# Patient Record
Sex: MALE | Race: WHITE | Employment: FULL TIME | URBAN - METROPOLITAN AREA
[De-identification: names, ages, dates, MRNs, and addresses within clinical notes are randomized per-mention and may not be internally consistent; named-entity substitution may affect disease eponyms.]

---

## 2017-06-15 ENCOUNTER — OFFICE VISIT (OUTPATIENT)
Dept: INTERNAL MEDICINE CLINIC | Age: 42
End: 2017-06-15

## 2017-06-15 VITALS
WEIGHT: 152.2 LBS | TEMPERATURE: 95.9 F | OXYGEN SATURATION: 97 % | SYSTOLIC BLOOD PRESSURE: 121 MMHG | BODY MASS INDEX: 24.46 KG/M2 | HEIGHT: 66 IN | DIASTOLIC BLOOD PRESSURE: 80 MMHG | RESPIRATION RATE: 18 BRPM | HEART RATE: 62 BPM

## 2017-06-15 DIAGNOSIS — M79.2 NEUROPATHIC PAIN OF FOOT, RIGHT: Primary | ICD-10-CM

## 2017-06-15 RX ORDER — PANTOPRAZOLE SODIUM 40 MG/1
TABLET, DELAYED RELEASE ORAL
Refills: 5 | COMMUNITY
Start: 2017-05-22 | End: 2018-12-14

## 2017-06-15 RX ORDER — OXCARBAZEPINE 300 MG/1
300 TABLET, FILM COATED ORAL DAILY
Qty: 30 TAB | Refills: 2 | Status: SHIPPED | OUTPATIENT
Start: 2017-06-15 | End: 2017-12-30 | Stop reason: SDUPTHER

## 2017-06-15 NOTE — PROGRESS NOTES
Subjective:     Chief Complaint   Patient presents with    Medication Refill        He  is a 42y.o. year old male who presents today for medication refill for Trileptal. He has been taking this med for many years as needed for right foot pain. Reports that he has been experiencing the pain since he has the surgery in his thoracic spine when he was in his in 10 yrs. He was seen by [facility] and after trial of different med Trileptal worked better. See last note for more info. Pertinent items are noted in HPI. Objective:     Vitals:    06/15/17 1213   BP: 121/80   Pulse: 62   Resp: 18   Temp: 95.9 °F (35.5 °C)   TempSrc: Oral   SpO2: 97%   Weight: 152 lb 3.2 oz (69 kg)   Height: 5' 6\" (1.676 m)       Physical Examination: General appearance - alert, well appearing, and in no distress, oriented to person, place, and time and normal appearing weight  Mental status - alert, oriented to person, place, and time, normal mood, behavior, speech, dress, motor activity, and thought processes  Chest - clear to auscultation, no wheezes, rales or rhonchi, symmetric air entry  Heart - normal rate, regular rhythm, normal S1, S2, no murmurs, rubs, clicks or gallops  Neurological - limping noted.  Otherwise normal.      Allergies   Allergen Reactions    Clindamycin Rash      Social History     Social History    Marital status: SINGLE     Spouse name: N/A    Number of children: N/A    Years of education: N/A     Social History Main Topics    Smoking status: Never Smoker    Smokeless tobacco: Never Used    Alcohol use Yes      Comment: socially    Drug use: No    Sexual activity: Not Currently     Other Topics Concern    None     Social History Narrative      Family History   Problem Relation Age of Onset    Stroke Father     Hypertension Father     Cancer Maternal Grandfather     Stroke Paternal Grandmother       Past Surgical History:   Procedure Laterality Date    ABDOMEN SURGERY PROC UNLISTED Inguinal hernia repair    HX ORTHOPAEDIC      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 H/O radiation treatment . 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 30 Tab 2    pantoprazole (PROTONIX) 40 mg tablet TAKE 1 TABLET BY MOUTH EVERY MORNING  5        Assessment/ Plan:   Keny Hagan was seen today for medication refill. Diagnoses and all orders for this visit:    Neuropathic pain of foot, right  -     OXcarbazepine (TRILEPTAL) 300 mg tablet; Take 1 Tab by mouth daily.  -     CBC W/O DIFF  -     HEPATIC FUNCTION PANEL           Medication risks/benefits/costs/interactions/alternatives discussed with patient. Advised patient to call back or return to office if symptoms worsen/change/persist. If patient cannot reach us or should anything more severe/urgent arise he/she should proceed directly to the nearest emergency department. Discussed expected course/resolution/complications of diagnosis in detail with patient. Patient given a written after visit summary which includes her diagnoses, current medications and vitals. Patient expressed understanding with the diagnosis and plan. Follow-up Disposition:  Return in about 1 month (around 7/15/2017) for complete physical  and fasting blood work. Sidney Barthel

## 2017-06-15 NOTE — LETTER
6/16/2017 7:53 AM 
 
Mr. Baron Genaro [facility] [address] Dear Baron Lam: Please find your most recent results below. Resulted Orders CBC W/O DIFF Result Value Ref Range WBC 6.7 3.4 - 10.8 x10E3/uL  
 RBC 4.94 4.14 - 5.80 x10E6/uL HGB 15.3 12.6 - 17.7 g/dL HCT 45.2 37.5 - 51.0 % MCV 92 79 - 97 fL  
 MCH 31.0 26.6 - 33.0 pg  
 MCHC 33.8 31.5 - 35.7 g/dL  
 RDW 13.4 12.3 - 15.4 % PLATELET 804 710 - 791 x10E3/uL Narrative Performed at:  [address]  [id number] : Helen Nunez MD, Phone:  [phone] HEPATIC FUNCTION PANEL Result Value Ref Range Protein, total 7.1 6.0 - 8.5 g/dL Albumin 4.6 3.5 - 5.5 g/dL Bilirubin, total 0.7 0.0 - 1.2 mg/dL Bilirubin, direct 0.13 0.00 - 0.40 mg/dL Alk. phosphatase 114 39 - 117 IU/L  
 AST (SGOT) 17 0 - 40 IU/L  
 ALT (SGPT) 13 0 - 44 IU/L Narrative Performed at:  [address]  [id number] : Helen Nunez MD, Phone:  [phone] RECOMMENDATIONS: 
 
CBC and liver function is normal. 
 
Please call me if you have any questions: [phone] Sincerely, 
 
 
León Sarabia MD

## 2017-06-15 NOTE — MR AVS SNAPSHOT
Visit Information Date & Time Provider Department Dept. Phone Encounter #  
 6/15/2017 12:15 PM León Sarabia MD [facility] [phone] [id number] Follow-up Instructions Return in about 1 month (around 7/15/2017) for complete physical  and fasting blood work. Pooja Soriano Upcoming Health Maintenance Date Due DTaP/Tdap/Td series (1 - Tdap) 11/17/1995 INFLUENZA AGE 9 TO ADULT 8/1/2017 Allergies as of 6/15/2017  Review Complete On: 6/15/2017 By: Charles Allred LPN Severity Noted Reaction Type Reactions Clindamycin Medium 08/11/2013   Systemic Rash Current Immunizations  Never Reviewed No immunizations on file. Not reviewed this visit You Were Diagnosed With   
  
 Codes Comments Neuropathic pain of foot, right    -  Primary ICD-10-CM: G57.91 
ICD-9-CM: 355.8 Vitals BP Pulse Temp Resp Height(growth percentile) Weight(growth percentile) 121/80 (BP 1 Location: Left arm, BP Patient Position: Sitting) 62 95.9 °F (35.5 °C) (Oral) 18 5' 6\" (1.676 m) 152 lb 3.2 oz (69 kg) SpO2 BMI Smoking Status 97% 24.57 kg/m2 Never Smoker Vitals History BMI and BSA Data Body Mass Index Body Surface Area 24.57 kg/m 2 1.79 m 2 Preferred Pharmacy Pharmacy Name Phone [id number] [place], [place] - [address] AT [address] [place] [address] [phone] Your Updated Medication List  
  
   
This list is accurate as of: 6/15/17 12:28 PM.  Always use your most recent med list. OXcarbazepine 300 mg tablet Commonly known as:  TRILEPTAL Take 1 Tab by mouth daily. pantoprazole 40 mg tablet Commonly known as:  PROTONIX  
TAKE 1 TABLET BY MOUTH EVERY MORNING Prescriptions Sent to Pharmacy Refills OXcarbazepine (TRILEPTAL) 300 mg tablet 2 Sig: Take 1 Tab by mouth daily.   
 Class: Normal  
 Pharmacy: [facility]  #: [phone] Route: Oral  
  
We Performed the Following CBC W/O DIFF [99483 CPT(R)] HEPATIC FUNCTION PANEL [76407 CPT(R)] Follow-up Instructions Return in about 1 month (around 7/15/2017) for complete physical  and fasting blood work. Teddy Guzman [facility] SERVICES! Dear Jong Johnson: 
Thank you for requesting a [facility] account. Our records indicate that you have previously registered for a [employer] [facility] [employer] account but its currently inactive. Please call our [employer] [facility] support line at [phone]. Additional Information If you have questions, please visit the Frequently Asked Questions section of the [employer] website at [email or web address] Remember, [employer] [facility] is NOT to be used for urgent needs. For medical emergencies, dial 911. Now available from your iPhone and Android! Please provide this summary of care documentation to your next provider. Your primary care clinician is listed as Cholo Reyes. If you have any questions after today's visit, please call [phone].

## 2017-06-16 LAB
ALBUMIN SERPL-MCNC: 4.6 G/DL (ref 3.5–5.5)
ALP SERPL-CCNC: 114 IU/L (ref 39–117)
ALT SERPL-CCNC: 13 IU/L (ref 0–44)
AST SERPL-CCNC: 17 IU/L (ref 0–40)
BILIRUB DIRECT SERPL-MCNC: 0.13 MG/DL (ref 0–0.4)
BILIRUB SERPL-MCNC: 0.7 MG/DL (ref 0–1.2)
ERYTHROCYTE [DISTWIDTH] IN BLOOD BY AUTOMATED COUNT: 13.4 % (ref 12.3–15.4)
HCT VFR BLD AUTO: 45.2 % (ref 37.5–51)
HGB BLD-MCNC: 15.3 G/DL (ref 12.6–17.7)
MCH RBC QN AUTO: 31 PG (ref 26.6–33)
MCHC RBC AUTO-ENTMCNC: 33.8 G/DL (ref 31.5–35.7)
MCV RBC AUTO: 92 FL (ref 79–97)
PLATELET # BLD AUTO: 285 X10E3/UL (ref 150–379)
PROT SERPL-MCNC: 7.1 G/DL (ref 6–8.5)
RBC # BLD AUTO: 4.94 X10E6/UL (ref 4.14–5.8)
WBC # BLD AUTO: 6.7 X10E3/UL (ref 3.4–10.8)

## 2017-12-30 DIAGNOSIS — M79.2 NEUROPATHIC PAIN OF FOOT, RIGHT: ICD-10-CM

## 2018-01-02 RX ORDER — OXCARBAZEPINE 300 MG/1
TABLET, FILM COATED ORAL
Qty: 30 TAB | Refills: 2 | Status: SHIPPED | OUTPATIENT
Start: 2018-01-02 | End: 2021-05-04 | Stop reason: SDUPTHER

## 2018-11-03 ENCOUNTER — APPOINTMENT (OUTPATIENT)
Dept: CT IMAGING | Age: 43
End: 2018-11-03
Attending: PHYSICIAN ASSISTANT
Payer: COMMERCIAL

## 2018-11-03 ENCOUNTER — HOSPITAL ENCOUNTER (EMERGENCY)
Age: 43
Discharge: HOME | End: 2018-11-03
Attending: EMERGENCY MEDICINE
Payer: COMMERCIAL

## 2018-11-03 VITALS
OXYGEN SATURATION: 98 % | TEMPERATURE: 98.1 F | RESPIRATION RATE: 15 BRPM | HEART RATE: 91 BPM | DIASTOLIC BLOOD PRESSURE: 94 MMHG | SYSTOLIC BLOOD PRESSURE: 158 MMHG
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
## 2018-11-03 DIAGNOSIS — R10.32 ABDOMINAL PAIN, LLQ (LEFT LOWER QUADRANT): Primary | ICD-10-CM

## 2018-11-03 LAB
ALBUMIN SERPL-MCNC: 3.9 G/DL (ref 3.5–5)
ALBUMIN/GLOB SERPL: 1.1 {RATIO} (ref 1.1–2.2)
ALP SERPL-CCNC: 130 U/L (ref 45–117)
ALT SERPL-CCNC: 25 U/L (ref 12–78)
ANION GAP SERPL CALC-SCNC: 9 MMOL/L (ref 5–15)
AST SERPL-CCNC: 15 U/L (ref 15–37)
BASOPHILS # BLD: 0.1 K/UL (ref 0–0.1)
BASOPHILS NFR BLD: 1 % (ref 0–1)
BILIRUB SERPL-MCNC: 0.5 MG/DL (ref 0.2–1)
BUN SERPL-MCNC: 19 MG/DL (ref 6–20)
BUN/CREAT SERPL: 15 (ref 12–20)
CALCIUM SERPL-MCNC: 8.6 MG/DL (ref 8.5–10.1)
CHLORIDE SERPL-SCNC: 106 MMOL/L (ref 97–108)
CO2 SERPL-SCNC: 25 MMOL/L (ref 21–32)
COMMENT, HOLDF: NORMAL
CREAT SERPL-MCNC: 1.29 MG/DL (ref 0.7–1.3)
DIFFERENTIAL METHOD BLD: ABNORMAL
EOSINOPHIL # BLD: 0.1 K/UL (ref 0–0.4)
EOSINOPHIL NFR BLD: 2 % (ref 0–7)
ERYTHROCYTE [DISTWIDTH] IN BLOOD BY AUTOMATED COUNT: 12.2 % (ref 11.5–14.5)
GLOBULIN SER CALC-MCNC: 3.7 G/DL (ref 2–4)
GLUCOSE SERPL-MCNC: 91 MG/DL (ref 65–100)
HCT VFR BLD AUTO: 45 % (ref 36.6–50.3)
HGB BLD-MCNC: 15.6 G/DL (ref 12.1–17)
IMM GRANULOCYTES # BLD: 0 K/UL (ref 0–0.04)
IMM GRANULOCYTES NFR BLD AUTO: 1 % (ref 0–0.5)
LIPASE SERPL-CCNC: 134 U/L (ref 73–393)
LYMPHOCYTES # BLD: 1.4 K/UL (ref 0.8–3.5)
LYMPHOCYTES NFR BLD: 26 % (ref 12–49)
MCH RBC QN AUTO: 31.8 PG (ref 26–34)
MCHC RBC AUTO-ENTMCNC: 34.7 G/DL (ref 30–36.5)
MCV RBC AUTO: 91.8 FL (ref 80–99)
MONOCYTES # BLD: 0.7 K/UL (ref 0–1)
MONOCYTES NFR BLD: 13 % (ref 5–13)
NEUTS SEG # BLD: 3.1 K/UL (ref 1.8–8)
NEUTS SEG NFR BLD: 57 % (ref 32–75)
NRBC # BLD: 0 K/UL (ref 0–0.01)
NRBC BLD-RTO: 0 PER 100 WBC
PLATELET # BLD AUTO: 263 K/UL (ref 150–400)
PMV BLD AUTO: 9.8 FL (ref 8.9–12.9)
POTASSIUM SERPL-SCNC: 4.1 MMOL/L (ref 3.5–5.1)
PROT SERPL-MCNC: 7.6 G/DL (ref 6.4–8.2)
RBC # BLD AUTO: 4.9 M/UL (ref 4.1–5.7)
SAMPLES BEING HELD,HOLD: NORMAL
SODIUM SERPL-SCNC: 140 MMOL/L (ref 136–145)
WBC # BLD AUTO: 5.4 K/UL (ref 4.1–11.1)

## 2018-11-03 PROCEDURE — 74177 CT ABD & PELVIS W/CONTRAST: CPT

## 2018-11-03 PROCEDURE — 99283 EMERGENCY DEPT VISIT LOW MDM: CPT

## 2018-11-03 PROCEDURE — 36415 COLL VENOUS BLD VENIPUNCTURE: CPT | Performed by: PHYSICIAN ASSISTANT

## 2018-11-03 PROCEDURE — 83690 ASSAY OF LIPASE: CPT | Performed by: PHYSICIAN ASSISTANT

## 2018-11-03 PROCEDURE — 85025 COMPLETE CBC W/AUTO DIFF WBC: CPT | Performed by: PHYSICIAN ASSISTANT

## 2018-11-03 PROCEDURE — 74011636320 HC RX REV CODE- 636/320: Performed by: EMERGENCY MEDICINE

## 2018-11-03 PROCEDURE — 74011000258 HC RX REV CODE- 258: Performed by: EMERGENCY MEDICINE

## 2018-11-03 PROCEDURE — 80053 COMPREHEN METABOLIC PANEL: CPT | Performed by: PHYSICIAN ASSISTANT

## 2018-11-03 RX ORDER — SODIUM CHLORIDE 9 MG/ML
50 INJECTION, SOLUTION INTRAVENOUS
Status: COMPLETED | OUTPATIENT
Start: 2018-11-03 | End: 2018-11-03

## 2018-11-03 RX ADMIN — SODIUM CHLORIDE 50 ML/HR: 900 INJECTION, SOLUTION INTRAVENOUS at 15:10

## 2018-11-03 RX ADMIN — IOPAMIDOL 100 ML: 755 INJECTION, SOLUTION INTRAVENOUS at 15:10

## 2018-11-03 RX ADMIN — SODIUM CHLORIDE 100 ML: 900 INJECTION, SOLUTION INTRAVENOUS at 14:19
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
## 2018-12-14 ENCOUNTER — HOSPITAL ENCOUNTER (EMERGENCY)
Age: 43
Discharge: HOME | End: 2018-12-14
Attending: EMERGENCY MEDICINE | Admitting: EMERGENCY MEDICINE
Payer: COMMERCIAL

## 2018-12-14 ENCOUNTER — APPOINTMENT (OUTPATIENT)
Dept: GENERAL RADIOLOGY | Age: 43
End: 2018-12-14
Attending: EMERGENCY MEDICINE
Payer: COMMERCIAL

## 2018-12-14 VITALS
DIASTOLIC BLOOD PRESSURE: 84 MMHG | HEART RATE: 87 BPM | TEMPERATURE: 98 F | OXYGEN SATURATION: 97 % | SYSTOLIC BLOOD PRESSURE: 125 MMHG | RESPIRATION RATE: 30 BRPM

## 2018-12-14 DIAGNOSIS — R10.13 ABDOMINAL PAIN, EPIGASTRIC: Primary | ICD-10-CM

## 2018-12-14 DIAGNOSIS — R05.9 COUGH: ICD-10-CM

## 2018-12-14 DIAGNOSIS — R07.9 CHEST PAIN, UNSPECIFIED TYPE: ICD-10-CM

## 2018-12-14 DIAGNOSIS — M79.2 NEUROPATHIC PAIN OF FOOT, RIGHT: ICD-10-CM

## 2018-12-14 LAB
ALBUMIN SERPL-MCNC: 3.5 G/DL (ref 3.5–5)
ALBUMIN/GLOB SERPL: 1 {RATIO} (ref 1.1–2.2)
ALP SERPL-CCNC: 103 U/L (ref 45–117)
ALT SERPL-CCNC: 32 U/L (ref 12–78)
ANION GAP SERPL CALC-SCNC: 8 MMOL/L (ref 5–15)
AST SERPL-CCNC: 16 U/L (ref 15–37)
ATRIAL RATE: 81 BPM
BASOPHILS # BLD: 0 K/UL (ref 0–0.1)
BASOPHILS NFR BLD: 1 % (ref 0–1)
BILIRUB SERPL-MCNC: 0.3 MG/DL (ref 0.2–1)
BUN SERPL-MCNC: 22 MG/DL (ref 6–20)
BUN/CREAT SERPL: 17 (ref 12–20)
CALCIUM SERPL-MCNC: 8 MG/DL (ref 8.5–10.1)
CALCULATED P AXIS, ECG09: 48 DEGREES
CALCULATED R AXIS, ECG10: 61 DEGREES
CALCULATED T AXIS, ECG11: 58 DEGREES
CHLORIDE SERPL-SCNC: 103 MMOL/L (ref 97–108)
CO2 SERPL-SCNC: 27 MMOL/L (ref 21–32)
COMMENT, HOLDF: NORMAL
CREAT SERPL-MCNC: 1.26 MG/DL (ref 0.7–1.3)
D DIMER PPP FEU-MCNC: <0.19 MG/L FEU (ref 0–0.65)
DIAGNOSIS, 93000: NORMAL
DIFFERENTIAL METHOD BLD: ABNORMAL
EOSINOPHIL # BLD: 0.1 K/UL (ref 0–0.4)
EOSINOPHIL NFR BLD: 1 % (ref 0–7)
ERYTHROCYTE [DISTWIDTH] IN BLOOD BY AUTOMATED COUNT: 12 % (ref 11.5–14.5)
GLOBULIN SER CALC-MCNC: 3.6 G/DL (ref 2–4)
GLUCOSE SERPL-MCNC: 100 MG/DL (ref 65–100)
HCT VFR BLD AUTO: 41.4 % (ref 36.6–50.3)
HGB BLD-MCNC: 14.3 G/DL (ref 12.1–17)
IMM GRANULOCYTES # BLD: 0.1 K/UL (ref 0–0.04)
IMM GRANULOCYTES NFR BLD AUTO: 1 % (ref 0–0.5)
LIPASE SERPL-CCNC: 105 U/L (ref 73–393)
LYMPHOCYTES # BLD: 1.5 K/UL (ref 0.8–3.5)
LYMPHOCYTES NFR BLD: 25 % (ref 12–49)
MCH RBC QN AUTO: 31.7 PG (ref 26–34)
MCHC RBC AUTO-ENTMCNC: 34.5 G/DL (ref 30–36.5)
MCV RBC AUTO: 91.8 FL (ref 80–99)
MONOCYTES # BLD: 0.6 K/UL (ref 0–1)
MONOCYTES NFR BLD: 10 % (ref 5–13)
NEUTS SEG # BLD: 3.6 K/UL (ref 1.8–8)
NEUTS SEG NFR BLD: 62 % (ref 32–75)
NRBC # BLD: 0 K/UL (ref 0–0.01)
NRBC BLD-RTO: 0 PER 100 WBC
P-R INTERVAL, ECG05: 158 MS
PLATELET # BLD AUTO: 236 K/UL (ref 150–400)
PMV BLD AUTO: 9.7 FL (ref 8.9–12.9)
POTASSIUM SERPL-SCNC: 3.6 MMOL/L (ref 3.5–5.1)
PROT SERPL-MCNC: 7.1 G/DL (ref 6.4–8.2)
Q-T INTERVAL, ECG07: 362 MS
QRS DURATION, ECG06: 76 MS
QTC CALCULATION (BEZET), ECG08: 420 MS
RBC # BLD AUTO: 4.51 M/UL (ref 4.1–5.7)
SAMPLES BEING HELD,HOLD: NORMAL
SODIUM SERPL-SCNC: 138 MMOL/L (ref 136–145)
TROPONIN I BLD-MCNC: <0.04 NG/ML (ref 0–0.08)
TROPONIN I SERPL-MCNC: <0.05 NG/ML
VENTRICULAR RATE, ECG03: 81 BPM
WBC # BLD AUTO: 5.8 K/UL (ref 4.1–11.1)

## 2018-12-14 PROCEDURE — 84484 ASSAY OF TROPONIN QUANT: CPT

## 2018-12-14 PROCEDURE — 36415 COLL VENOUS BLD VENIPUNCTURE: CPT

## 2018-12-14 PROCEDURE — 85025 COMPLETE CBC W/AUTO DIFF WBC: CPT

## 2018-12-14 PROCEDURE — 83690 ASSAY OF LIPASE: CPT

## 2018-12-14 PROCEDURE — 74011250636 HC RX REV CODE- 250/636: Performed by: EMERGENCY MEDICINE

## 2018-12-14 PROCEDURE — 74011000250 HC RX REV CODE- 250: Performed by: EMERGENCY MEDICINE

## 2018-12-14 PROCEDURE — 99285 EMERGENCY DEPT VISIT HI MDM: CPT

## 2018-12-14 PROCEDURE — 80053 COMPREHEN METABOLIC PANEL: CPT

## 2018-12-14 PROCEDURE — 71046 X-RAY EXAM CHEST 2 VIEWS: CPT

## 2018-12-14 PROCEDURE — 93005 ELECTROCARDIOGRAM TRACING: CPT

## 2018-12-14 PROCEDURE — 85379 FIBRIN DEGRADATION QUANT: CPT

## 2018-12-14 PROCEDURE — 96374 THER/PROPH/DIAG INJ IV PUSH: CPT

## 2018-12-14 PROCEDURE — 74011250637 HC RX REV CODE- 250/637: Performed by: EMERGENCY MEDICINE

## 2018-12-14 RX ORDER — MAG HYDROX/ALUMINUM HYD/SIMETH 200-200-20
SUSPENSION, ORAL (FINAL DOSE FORM) ORAL
Status: DISCONTINUED
Start: 2018-12-14 | End: 2018-12-14 | Stop reason: HOSPADM

## 2018-12-14 RX ORDER — LIDOCAINE HYDROCHLORIDE 20 MG/ML
SOLUTION OROPHARYNGEAL
Status: DISCONTINUED
Start: 2018-12-14 | End: 2018-12-14 | Stop reason: HOSPADM

## 2018-12-14 RX ORDER — FAMOTIDINE 10 MG/ML
INJECTION INTRAVENOUS
Status: DISCONTINUED
Start: 2018-12-14 | End: 2018-12-14 | Stop reason: HOSPADM

## 2018-12-14 RX ORDER — PANTOPRAZOLE SODIUM 40 MG/1
40 TABLET, DELAYED RELEASE ORAL DAILY
Qty: 30 TAB | Refills: 5 | Status: SHIPPED | OUTPATIENT
Start: 2018-12-14 | End: 2018-12-14

## 2018-12-14 RX ORDER — GUAIFENESIN 100 MG/5ML
LIQUID (ML) ORAL
Status: DISCONTINUED
Start: 2018-12-14 | End: 2018-12-14 | Stop reason: HOSPADM

## 2018-12-14 RX ORDER — PANTOPRAZOLE SODIUM 40 MG/1
40 TABLET, DELAYED RELEASE ORAL DAILY
Qty: 30 TAB | Refills: 5 | Status: SHIPPED | OUTPATIENT
Start: 2018-12-14 | End: 2019-01-13

## 2018-12-14 RX ORDER — GUAIFENESIN 100 MG/5ML
81 LIQUID (ML) ORAL
Status: COMPLETED | OUTPATIENT
Start: 2018-12-14 | End: 2018-12-14

## 2018-12-14 RX ORDER — FAMOTIDINE 10 MG/ML
20 INJECTION INTRAVENOUS
Status: DISCONTINUED | OUTPATIENT
Start: 2018-12-14 | End: 2018-12-14 | Stop reason: SDUPTHER

## 2018-12-14 RX ADMIN — ASPIRIN 81 MG 81 MG: 81 TABLET ORAL at 01:28

## 2018-12-14 RX ADMIN — LIDOCAINE HYDROCHLORIDE 40 ML: 20 SOLUTION ORAL; TOPICAL at 01:26

## 2018-12-14 RX ADMIN — FAMOTIDINE 20 MG: 10 INJECTION, SOLUTION INTRAVENOUS at 01:27

## 2018-12-14 NOTE — ED PROVIDER NOTES
The patient presents to the ED with chest pain, shortness of breath and cough. Symptoms began at 11 PM and woke him from his sleep. He has burning from his upper abdomen into his lower chest. The pain was constant, but has improved. The pain was moderate at a 5/10 and is now very mild. He has also developed an intermittent cough since the onset of pain. The pain is increased with coughing. He has mild shortness of breath. The pain is not increased with deep breath. He has hx reflux and esophageal strictures x 2. He had been on Protonix, but his GI MD took him off of it a few years ago. He cannot recall the name of his GI MD, but states \"it started with a P.\" He has mild nausea. No meds were taken PTA. He last at at 5:30 PM - had ribs and greens. No family hx of CAD or PE/DVT. No recent travel. 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 H/O radiation treatment .  1985    HX OTHER SURGICAL  8/11/13    I&D of buttocks abscess x3 by Dr Jameson Bedolla         Family History:   Problem Relation Age of Onset    Stroke Father     Hypertension Father     Cancer Maternal Grandfather     Stroke Paternal Grandmother        Social History     Socioeconomic History    Marital status: SINGLE     Spouse name: Not on file    Number of children: Not on file    Years of education: Not on file    Highest education level: Not on file   Social Needs    Financial resource strain: Not on file    Food insecurity - worry: Not on file    Food insecurity - inability: Not on file   [employer] needs - medical: Not on file   [employer] needs - non-medical: Not on file   Occupational History    Not on file   Tobacco Use    Smoking status: Never Smoker    Smokeless tobacco: Never Used   Substance and Sexual Activity    Alcohol use: Yes     Comment: socially    Drug use: No    Sexual activity: Not Currently   Other Topics Concern    Not on file   Social History Narrative    Not on file         ALLERGIES: Clindamycin    Review of Systems   Constitutional: Negative for appetite change and fever. HENT: Negative for congestion, nosebleeds and sore throat. Eyes: Negative for discharge and visual disturbance. Respiratory: Positive for cough and shortness of breath. Cardiovascular: Positive for chest pain. Gastrointestinal: Positive for abdominal pain. Negative for diarrhea, nausea and vomiting. Genitourinary: Negative for dysuria. Musculoskeletal: Negative. Skin: Negative for rash. Neurological: Negative for weakness and headaches. Hematological: Negative for adenopathy. Psychiatric/Behavioral: Negative. All other systems reviewed and are negative. Vitals:    12/14/18 0055 12/14/18 0112 12/14/18 0120 12/14/18 0200   BP:  131/86 131/86 128/82   Pulse: 92 81 82    Resp:  16 15 16   Temp:  98.2 °F (36.8 °C)     SpO2: 97% 97% 96% 99%            Physical Exam   Constitutional: He is oriented to person, place, and time. He appears well-developed and well-nourished. HENT:   Head: Normocephalic and atraumatic. Right Ear: External ear normal.   Mouth/Throat: Oropharynx is clear and moist.   Eyes: Conjunctivae and EOM are normal. Pupils are equal, round, and reactive to light. Neck: Normal range of motion. Neck supple. Cardiovascular: Normal rate, regular rhythm and normal heart sounds. Pulmonary/Chest: Effort normal and breath sounds normal.   Abdominal: Soft. Bowel sounds are normal. There is no tenderness. Musculoskeletal: Normal range of motion. He exhibits no edema or tenderness. Neurological: He is alert and oriented to person, place, and time. Skin: Skin is warm and dry.    Psychiatric: He has a normal mood and affect. His behavior is normal.   Nursing note and vitals reviewed. MDM       Procedures      ED EKG interpretation:  Rhythm: normal sinus rhythm; and regular . Rate (approx.): 80; Axis: normal; P wave: normal; QRS interval: normal ; ST/T wave: normal; This EKG was interpreted by Jp Acosta MD,ED Provider. A/P:  1. Chest pain - suspect related to GERD. 2. GERD - resume Protonix. F/U with GI.  3. Cough - suspect related to GERD      Patient's results have been reviewed with them. Patient and/or family have verbally conveyed their understanding and agreement of the patient's signs, symptoms, diagnosis, treatment and prognosis and additionally agree to follow up as recommended or return to the Emergency Room should their condition change prior to follow-up. Discharge instructions have also been provided to the patient with some educational information regarding their diagnosis as well a list of reasons why they would want to return to the ER prior to their follow-up appointment should their condition change.

## 2018-12-14 NOTE — DISCHARGE INSTRUCTIONS
- Please restart your Protonix. - Please call GI in the AM and arrange follow-up. - Return to ED for any concerns. Chest Pain: Care Instructions  Your Care Instructions  There are many things that can cause chest pain. Some are not serious and will get better on their own in a few days. But some kinds of chest pain need more testing and treatment. Your doctor may have recommended a follow-up visit in the next 8 to 12 hours. If you are not getting better, you may need more tests or treatment. Even though your doctor has released you, you still need to watch for any problems. The doctor carefully checked you, but sometimes problems can develop later. If you have new symptoms or if your symptoms do not get better, get medical care right away. If you have worse or different chest pain or pressure that lasts more than 5 minutes or you passed out (lost consciousness), call 911 or seek other emergency help right away. A medical visit is only one step in your treatment. Even if you feel better, you still need to do what your doctor recommends, such as going to all suggested follow-up appointments and taking medicines exactly as directed. This will help you recover and help prevent future problems. How can you care for yourself at home? · Rest until you feel better. · Take your medicine exactly as prescribed. Call your doctor if you think you are having a problem with your medicine. · Do not drive after taking a prescription pain medicine. When should you call for help? Call 911 if:    · You passed out (lost consciousness).     · You have severe difficulty breathing.     · You have symptoms of a heart attack. These may include:  ? Chest pain or pressure, or a strange feeling in your chest.  ? Sweating. ? Shortness of breath. ? Nausea or vomiting. ? Pain, pressure, or a strange feeling in your back, neck, jaw, or upper belly or in one or both shoulders or arms. ? Lightheadedness or sudden weakness. ?  A fast or irregular heartbeat. After you call 911, the  may tell you to chew 1 adult-strength or 2 to 4 low-dose aspirin. Wait for an ambulance. Do not try to drive yourself.    Call your doctor today if:    · You have any trouble breathing.     · Your chest pain gets worse.     · You are dizzy or lightheaded, or you feel like you may faint.     · You are not getting better as expected.     · You are having new or different chest pain. Where can you learn more? Go to http://alma-ashwin.info/. Enter A120 in the search box to learn more about \"Chest Pain: Care Instructions. \"  Current as of: November 20, 2017  Content Version: 11.8  © 6474-9550 [employer]. Care instructions adapted under license by [employer] (which disclaims liability or warranty for this information). If you have questions about a medical condition or this instruction, always ask your healthcare professional. [place] [address] any warranty or liability for your use of this information. Gastroesophageal Reflux Disease (GERD): Care Instructions  Your Care Instructions    Gastroesophageal reflux disease (GERD) is the backward flow of stomach acid into the esophagus. The esophagus is the tube that leads from your throat to your stomach. A one-way valve prevents the stomach acid from moving up into this tube. When you have GERD, this valve does not close tightly enough. If you have mild GERD symptoms including heartburn, you may be able to control the problem with antacids or over-the-counter medicine. Changing your diet, losing weight, and making other lifestyle changes can also help reduce symptoms. Follow-up care is a key part of your treatment and safety. Be sure to make and go to all appointments, and call your doctor if you are having problems. It's also a good idea to know your test results and keep a list of the medicines you take.   How can you care for yourself at home?  · Take your medicines exactly as prescribed. Call your doctor if you think you are having a problem with your medicine. · Your doctor may recommend over-the-counter medicine. For mild or occasional indigestion, antacids, such as Tums, Gaviscon, Mylanta, or Maalox, may help. Your doctor also may recommend over-the-counter acid reducers, such as Pepcid AC, Tagamet HB, Zantac 75, or Prilosec. Read and follow all instructions on the label. If you use these medicines often, talk with your doctor. · Change your eating habits. ? It's best to eat several small meals instead of two or three large meals. ? After you eat, wait 2 to 3 hours before you lie down. ? Chocolate, mint, and alcohol can make GERD worse. ? Spicy foods, foods that have a lot of acid (like tomatoes and oranges), and coffee can make GERD symptoms worse in some people. If your symptoms are worse after you eat a certain food, you may want to stop eating that food to see if your symptoms get better. · Do not smoke or chew tobacco. Smoking can make GERD worse. If you need help quitting, talk to your doctor about stop-smoking programs and medicines. These can increase your chances of quitting for good. · If you have GERD symptoms at night, raise the head of your bed 6 to 8 inches by putting the frame on blocks or placing a foam wedge under the head of your mattress. (Adding extra pillows does not work.)  · Do not wear tight clothing around your middle. · Lose weight if you need to. Losing just 5 to 10 pounds can help. When should you call for help? Call your doctor now or seek immediate medical care if:    · You have new or different belly pain.     · Your stools are black and tarlike or have streaks of blood.    Watch closely for changes in your health, and be sure to contact your doctor if:    · Your symptoms have not improved after 2 days.     · Food seems to catch in your throat or chest.   Where can you learn more?   Go to http://alma-ashwin.info/. Enter W582 in the search box to learn more about \"Gastroesophageal Reflux Disease (GERD): Care Instructions. \"  Current as of: March 28, 2018  Content Version: 11.8  © 2218-4458 [employer]. Care instructions adapted under license by [employer] (which disclaims liability or warranty for this information). If you have questions about a medical condition or this instruction, always ask your healthcare professional. [address] any warranty or liability for your use of this information. Esophageal Spasm: Care Instructions  Your Care Instructions    The esophagus is the tube that carries food from your mouth to your stomach. An esophageal spasm is when the muscles along the esophagus tighten in an irregular, painful way. Normally, the esophagus tightens in a coordinated manner to move food along and into the stomach. An esophageal spasm can prevent food from getting to the stomach. This leaves it stuck in the esophagus. The cause of esophageal spasm is not known, although it is more common in people who have gastroesophageal reflux disease (GERD). In some people, very hot or very cold foods can trigger a spasm. Follow-up care is a key part of your treatment and safety. Be sure to make and go to all appointments, and call your doctor if you are having problems. It's also a good idea to know your test results and keep a list of the medicines you take. How can you care for yourself at home? · Be safe with medicines. Take your medicines exactly as prescribed. Call your doctor if you think you are having a problem with your medicine. You will get more details on the specific medicines your doctor prescribes. · Treat other conditions that can make esophageal spasms worse, such as GERD. · To treat GERD:  ? Your doctor may recommend an over-the-counter medicine.  For mild or occasional indigestion, it may help to take antacids, such as Tums, Gaviscon, Mylanta, or Maalox. Be careful when you take over-the-counter antacid medicines. Many of these medicines have aspirin in them. Read the label to make sure that you are not taking more than the recommended dose. Too much aspirin can be harmful. ? Your doctor also may recommend over-the-counter acid reducers, such as famotidine (Pepcid AC), cimetidine (Tagamet HB), ranitidine (Zantac 75 and Zantac 150), or omeprazole (Prilosec). ? Eat several small meals instead of two or three large meals. ? After you eat, wait 2 to 3 hours before you lie down. ? Chocolate, mint, and alcohol can make GERD worse. They relax the valve between the esophagus and the stomach. ? Spicy foods, foods that have a lot of acid (like tomatoes and oranges), and coffee can make GERD symptoms worse in some people. If your symptoms are worse after you eat a certain food, you may want to stop eating that food to see if your symptoms get better. ? Do not smoke or chew tobacco.  ? If you have GERD symptoms at night, raise the head of your bed 6 to 8 inches. You can do this by putting the frame on blocks. Or you can place a foam wedge under the head of your mattress. (Adding extra pillows does not work.)  ? Do not wear tight clothing around your middle. ? Lose weight if you need to. Losing just 5 to 10 pounds can help. · Ask your doctor about relaxation and controlled breathing exercises. These may help reduce symptoms. · Avoid very hot or cold foods if they trigger esophageal spasms. When should you call for help? Call your doctor now or seek immediate medical care if:    · You have new or worse belly pain.     · You are vomiting.    Watch closely for changes in your health, and be sure to contact your doctor if:    · You have new or worse symptoms of indigestion.     · You have trouble or pain swallowing.     · You are losing weight.     · You do not get better as expected. Where can you learn more?   Go to http://alma-ashwin.info/. Enter T207 in the search box to learn more about \"Esophageal Spasm: Care Instructions. \"  Current as of: March 28, 2018  Content Version: 11.8  © 7795-5478 [employer]. Care instructions adapted under license by [employer] (which disclaims liability or warranty for this information). If you have questions about a medical condition or this instruction, always ask your healthcare professional. [place] [address] any warranty or liability for your use of this information.

## 2018-12-14 NOTE — ED TRIAGE NOTES
Pt arrives ambulatory from home c/o burning/pain in lower chest/epigastric area ~2300 last night when he went to sleep, pt reported he began coughing as well and then vomited x1; does report some SOB, denies cough prior to tonight; pt reports pain 5/10 at its worse, reports hx of reflux

## 2020-08-31 ENCOUNTER — OFFICE VISIT (OUTPATIENT)
Dept: PRIMARY CARE CLINIC | Age: 45
End: 2020-08-31
Payer: COMMERCIAL

## 2020-08-31 VITALS
BODY MASS INDEX: 26.03 KG/M2 | DIASTOLIC BLOOD PRESSURE: 81 MMHG | WEIGHT: 162 LBS | OXYGEN SATURATION: 100 % | HEIGHT: 66 IN | TEMPERATURE: 98 F | SYSTOLIC BLOOD PRESSURE: 137 MMHG | RESPIRATION RATE: 16 BRPM | HEART RATE: 60 BPM

## 2020-08-31 DIAGNOSIS — Z00.00 WELL ADULT ON ROUTINE HEALTH CHECK: Primary | ICD-10-CM

## 2020-08-31 DIAGNOSIS — M79.2 NEUROPATHIC PAIN OF RIGHT FOOT: ICD-10-CM

## 2020-08-31 DIAGNOSIS — Z79.899 HIGH RISK MEDICATION USE: ICD-10-CM

## 2020-08-31 PROCEDURE — 99396 PREV VISIT EST AGE 40-64: CPT | Performed by: FAMILY MEDICINE

## 2020-08-31 NOTE — PROGRESS NOTES
Subjective:   Jaki Francis is a 38 y.o. male presenting for his annual checkup. He is here after 3 years. He has been taking Trileptal for chronic foot/leg pain. He has been taking this med for many years as needed for right foot pain. Reports that he has been experiencing the pain since he has the surgery in his thoracic spine when he was in his in 9 yrs. He was seen by [facility] pain management and after trial of different med Trileptal worked better. ROS:  Feeling well. No dyspnea or chest pain on exertion. No abdominal pain, change in bowel habits, black or bloody stools. No urinary tract or prostatic symptoms. No neurological complaints. Specific concerns today: none. Patient Active Problem List   Diagnosis Code    Abscess of buttock L02.31    Stricture esophagus K22.2    Neuropathic pain of foot M79.2     Current Outpatient Medications   Medication Sig Dispense Refill    OXcarbazepine (TRILEPTAL) 300 mg tablet TAKE 1 TABLET BY MOUTH EVERY DAY 30 Tab 2     Allergies   Allergen Reactions    Clindamycin Rash     Past Medical History:   Diagnosis Date    MRSA infection     Other ill-defined conditions(799.89)     neurological pain s/p back surgery     Past Surgical History:   Procedure Laterality Date    ABDOMEN SURGERY PROC UNLISTED      Inguinal hernia repair    HX ORTHOPAEDIC      tumor removed from thoracic spine. H/O radiation treatment .  1985    HX OTHER SURGICAL  8/11/13    I&D of buttocks abscess x3 by Dr Colin Pinzon History     Tobacco Use    Smoking status: Never Smoker    Smokeless tobacco: Never Used   Substance Use Topics    Alcohol use: Yes     Comment: socially        Lab Results   Component Value Date/Time    WBC 5.8 12/14/2018 01:30 AM    HGB 14.3 12/14/2018 01:30 AM    HCT 41.4 12/14/2018 01:30 AM    PLATELET 514 29/97/6463 01:30 AM    MCV 91.8 12/14/2018 01:30 AM     No results found for: CHOL, CHOLPOCT, HDL, LDL, LDLC, LDLCPOC, LDLCEXT, TRIGL, TGLPOCT, CHHD, [facility]  Lab Results   Component Value Date/Time    ALT (SGPT) 32 12/14/2018 01:30 AM    Alk. phosphatase 103 12/14/2018 01:30 AM    Bilirubin, direct 0.13 06/15/2017 12:29 PM    Bilirubin, total 0.3 12/14/2018 01:30 AM    Albumin 3.5 12/14/2018 01:30 AM    Protein, total 7.1 12/14/2018 01:30 AM    PLATELET 248 81/20/8959 01:30 AM     Lab Results   Component Value Date/Time    GFR est non-AA >60 12/14/2018 01:30 AM    GFR est AA >60 12/14/2018 01:30 AM    Creatinine 1.26 12/14/2018 01:30 AM    BUN 22 (H) 12/14/2018 01:30 AM    Sodium 138 12/14/2018 01:30 AM    Potassium 3.6 12/14/2018 01:30 AM    Chloride 103 12/14/2018 01:30 AM    CO2 27 12/14/2018 01:30 AM     No results found for: TSH, TSH2, TSH3, TSHP, TSHELE, TSHEXT, TT3, T3U, T3UP, FRT3, FT3, FT4, FT4P, T4, T4P, FT4T, TT7, TSHEXT   No results found for: HBA1C, ODY6PLUF, HGBE8, WDA8BVVQ, QUX2FVTP      Objective:     Visit Vitals  /81 (BP 1 Location: Left arm, BP Patient Position: Sitting)   Pulse 60   Temp 98 °F (36.7 °C) (Temporal)   Resp 16   Ht 5' 6\" (1.676 m)   Wt 162 lb (73.5 kg)   SpO2 100%   BMI 26.15 kg/m²     The patient appears well, alert, oriented x 3, in no distress. ENT normal.  Neck supple. No adenopathy or thyromegaly. LISA. Lungs are clear, good air entry, no wheezes, rhonchi or rales. S1 and S2 normal, no murmurs, regular rate and rhythm. Abdomen is soft without tenderness, guarding, mass or organomegaly.  exam: deferred. Extremities show no edema, normal peripheral pulses. Limping. Assessment/Plan:   healthy adult male  lose weight, follow low fat diet, follow low salt diet, continue present plan, routine labs ordered, call if any problems. ICD-10-CM ICD-9-CM    1. Well adult on routine health check  Z00.00 V70.0 LIPID PANEL      METABOLIC PANEL, COMPREHENSIVE      CBC WITH AUTOMATED DIFF      THYROID CASCADE PROFILE      HEMOGLOBIN A1C WITH EAG      PSA W/ REFLX FREE PSA   2.  High risk medication use  Z79.899 V58.69 METABOLIC PANEL, COMPREHENSIVE     Diagnoses and all orders for this visit:    1. Well adult on routine health check  -     LIPID PANEL; Future  -     METABOLIC PANEL, COMPREHENSIVE; Future  -     CBC WITH AUTOMATED DIFF; Future  -     THYROID CASCADE PROFILE; Future  -     HEMOGLOBIN A1C WITH EAG; Future  -     PSA W/ REFLX FREE PSA; Future    2. High risk medication use  -     METABOLIC PANEL, COMPREHENSIVE; Future    3. Neuropathic pain of right foot      Well controlled with Trileptal.    Follow-up and Dispositions    · Return if symptoms worsen or fail to improve. current treatment plan is effective, no change in therapy  reviewed diet, exercise and weight control.

## 2020-08-31 NOTE — PROGRESS NOTES
Markus Magdaleno is a 38 y.o. male    Chief Complaint   Patient presents with    Complete Physical     not fasting       1. Have you been to the ER, urgent care clinic since your last visit? Hospitalized since your last visit? No    2. Have you seen or consulted any other health care providers outside of the [address] [place] [address] since your last visit? Include any pap smears or colon screening. No    No flowsheet data found.      Health Maintenance Due   Topic Date Due    DTaP/Tdap/Td series (1 - Tdap) 11/17/1995    Lipid Screen  11/17/2014

## 2020-09-01 DIAGNOSIS — Z00.00 WELL ADULT ON ROUTINE HEALTH CHECK: ICD-10-CM

## 2020-09-01 DIAGNOSIS — Z79.899 HIGH RISK MEDICATION USE: ICD-10-CM

## 2020-09-01 LAB
ALBUMIN SERPL-MCNC: 3.8 G/DL (ref 3.5–5)
ALBUMIN/GLOB SERPL: 1.1 {RATIO} (ref 1.1–2.2)
ALP SERPL-CCNC: 125 U/L (ref 45–117)
ALT SERPL-CCNC: 30 U/L (ref 12–78)
ANION GAP SERPL CALC-SCNC: 6 MMOL/L (ref 5–15)
AST SERPL-CCNC: 15 U/L (ref 15–37)
BASOPHILS # BLD: 0.1 K/UL (ref 0–0.1)
BASOPHILS NFR BLD: 1 % (ref 0–1)
BILIRUB SERPL-MCNC: 0.5 MG/DL (ref 0.2–1)
BUN SERPL-MCNC: 25 MG/DL (ref 6–20)
BUN/CREAT SERPL: 20 (ref 12–20)
CALCIUM SERPL-MCNC: 9.1 MG/DL (ref 8.5–10.1)
CHLORIDE SERPL-SCNC: 106 MMOL/L (ref 97–108)
CHOLEST SERPL-MCNC: 213 MG/DL
CO2 SERPL-SCNC: 29 MMOL/L (ref 21–32)
CREAT SERPL-MCNC: 1.24 MG/DL (ref 0.7–1.3)
DIFFERENTIAL METHOD BLD: ABNORMAL
EOSINOPHIL # BLD: 0.1 K/UL (ref 0–0.4)
EOSINOPHIL NFR BLD: 2 % (ref 0–7)
ERYTHROCYTE [DISTWIDTH] IN BLOOD BY AUTOMATED COUNT: 12.3 % (ref 11.5–14.5)
EST. AVERAGE GLUCOSE BLD GHB EST-MCNC: 97 MG/DL
GLOBULIN SER CALC-MCNC: 3.4 G/DL (ref 2–4)
GLUCOSE SERPL-MCNC: 100 MG/DL (ref 65–100)
HBA1C MFR BLD: 5 % (ref 4–5.6)
HCT VFR BLD AUTO: 45.7 % (ref 36.6–50.3)
HDLC SERPL-MCNC: 36 MG/DL
HDLC SERPL: 5.9 {RATIO} (ref 0–5)
HGB BLD-MCNC: 15.3 G/DL (ref 12.1–17)
IMM GRANULOCYTES # BLD AUTO: 0.1 K/UL (ref 0–0.04)
IMM GRANULOCYTES NFR BLD AUTO: 1 % (ref 0–0.5)
LDLC SERPL CALC-MCNC: 116.6 MG/DL (ref 0–100)
LIPID PROFILE,FLP: ABNORMAL
LYMPHOCYTES # BLD: 1.6 K/UL (ref 0.8–3.5)
LYMPHOCYTES NFR BLD: 28 % (ref 12–49)
MCH RBC QN AUTO: 31.3 PG (ref 26–34)
MCHC RBC AUTO-ENTMCNC: 33.5 G/DL (ref 30–36.5)
MCV RBC AUTO: 93.5 FL (ref 80–99)
MONOCYTES # BLD: 0.7 K/UL (ref 0–1)
MONOCYTES NFR BLD: 12 % (ref 5–13)
NEUTS SEG # BLD: 3.3 K/UL (ref 1.8–8)
NEUTS SEG NFR BLD: 56 % (ref 32–75)
NRBC # BLD: 0 K/UL (ref 0–0.01)
NRBC BLD-RTO: 0 PER 100 WBC
PLATELET # BLD AUTO: 276 K/UL (ref 150–400)
PMV BLD AUTO: 10.4 FL (ref 8.9–12.9)
POTASSIUM SERPL-SCNC: 4.4 MMOL/L (ref 3.5–5.1)
PROT SERPL-MCNC: 7.2 G/DL (ref 6.4–8.2)
RBC # BLD AUTO: 4.89 M/UL (ref 4.1–5.7)
SODIUM SERPL-SCNC: 141 MMOL/L (ref 136–145)
TRIGL SERPL-MCNC: 302 MG/DL (ref ?–150)
VLDLC SERPL CALC-MCNC: 60.4 MG/DL
WBC # BLD AUTO: 5.9 K/UL (ref 4.1–11.1)

## 2020-09-02 LAB
PSA SERPL-MCNC: 0.4 NG/ML (ref 0–4)
REFLEX CRITERIA: NORMAL
TSH SERPL-ACNC: 3.03 UIU/ML (ref 0.45–4.5)

## 2020-09-02 NOTE — PROGRESS NOTES
Sent via my chart. Good morning! Hope you are doing well. 1. Prostate enzyme level, CBC, kidney, liver, thyroid  level is within normal range. 2. Total cholesterol level and the  bad cholesterol level is  mildly elevated. No need to start any medication. Triglyceride level is moderately elevated. Please work on watching your diet, eat healthy, less delvalle and fatty food, more baked or grilled or broiled food. Exercise 150 minutes/week will be helpful as well. Will recheck level in 6 months. 3.  Average blood sugar( Hg A1c ) is in the normal range. Thanks.   Dr. Olesya Petty

## 2021-01-25 ENCOUNTER — OFFICE VISIT (OUTPATIENT)
Dept: PRIMARY CARE CLINIC | Age: 46
End: 2021-01-25
Payer: COMMERCIAL

## 2021-01-25 VITALS
WEIGHT: 163 LBS | OXYGEN SATURATION: 99 % | HEART RATE: 63 BPM | BODY MASS INDEX: 26.2 KG/M2 | SYSTOLIC BLOOD PRESSURE: 137 MMHG | TEMPERATURE: 96.6 F | DIASTOLIC BLOOD PRESSURE: 79 MMHG | RESPIRATION RATE: 14 BRPM | HEIGHT: 66 IN

## 2021-01-25 DIAGNOSIS — R29.2 HYPERREFLEXIA: ICD-10-CM

## 2021-01-25 DIAGNOSIS — M54.6 ACUTE BILATERAL THORACIC BACK PAIN: ICD-10-CM

## 2021-01-25 DIAGNOSIS — M25.551 HIP PAIN, ACUTE, RIGHT: ICD-10-CM

## 2021-01-25 DIAGNOSIS — C72.0 ASTROCYTOMA OF SPINAL CORD (HCC): Primary | ICD-10-CM

## 2021-01-25 DIAGNOSIS — M79.2 NEUROPATHIC PAIN OF RIGHT FOOT: ICD-10-CM

## 2021-01-25 PROCEDURE — 99214 OFFICE O/P EST MOD 30 MIN: CPT | Performed by: FAMILY MEDICINE

## 2021-01-25 RX ORDER — METHOCARBAMOL 750 MG/1
750 TABLET, FILM COATED ORAL
Qty: 30 TAB | Refills: 0 | Status: SHIPPED | OUTPATIENT
Start: 2021-01-25 | End: 2022-04-26

## 2021-01-25 NOTE — PROGRESS NOTES
Subjective:     Chief Complaint   Patient presents with    LOW BACK PAIN     mid back    Hip Pain     right for 6 months        He  is a 54y.o. year old male who presents today with a c/o pain in his mid back and right hip. 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 He reports that  he has been experiencing  pain since he has the surgery but for the past 6 months its been getting worse. Its a burning pain on both side of the thoracic area. He also has been having intermittent pain in her right hip and some times he feels like his hip joint gives out and feels week. He has baseline left lower extremity numbness. He was seen by [facility] pain management and after trial of different med Trileptal worked better for his above symptoms as well as for his chronic foot pain. Denies any urinary symptoms. Pertinent items are noted in HPI. Objective:     Vitals:    01/25/21 1004   BP: 137/79   Pulse: 63   Resp: 14   Temp: (!) 96.6 °F (35.9 °C)   TempSrc: Temporal   SpO2: 99%   Weight: 163 lb (73.9 kg)   Height: 5' 6\" (1.676 m)       Physical Examination: General appearance - alert, well appearing, and in no distress, oriented to person, place, and time and normal appearing weight  Mental status - alert, oriented to person, place, and time, normal mood, behavior, speech, dress, motor activity, and thought processes  Back exam - negative straight-leg raise bilaterally , scoliosis noted. Decrease sensation in left lower extremity with hyperreflexia.     Neurological - alert, oriented, normal speech, hyperreflexia of left knee reflex, neck supple without rigidity, limping,   Hip: exam is normal.      Allergies   Allergen Reactions    Clindamycin Rash      Social History     Socioeconomic History    Marital status: SINGLE     Spouse name: Not on file    Number of children: Not on file    Years of education: Not on file    Highest education level: Not on file   Tobacco Use    Smoking status: Never Smoker    Smokeless tobacco: Never Used   Substance and Sexual Activity    Alcohol use: Yes     Comment: socially    Drug use: No    Sexual activity: Not Currently      Family History   Problem Relation Age of Onset    Stroke Father     Hypertension Father     Cancer Maternal Grandfather     Stroke Paternal Grandmother       Past Surgical History:   Procedure Laterality Date    HX ORTHOPAEDIC      tumor removed from thoracic spine. H/O radiation treatment . 1985    HX OTHER SURGICAL  8/11/13    I&D of buttocks abscess x3 by Dr Saranya Hinds      Inguinal hernia repair      Past Medical History:   Diagnosis Date    MRSA infection     Other ill-defined conditions(359.89)     neurological pain s/p back surgery      Current Outpatient Medications   Medication Sig Dispense Refill    OXcarbazepine (TRILEPTAL) 300 mg tablet TAKE 1 TABLET BY MOUTH EVERY DAY 30 Tab 2        Assessment/ Plan:   Diagnoses and all orders for this visit:    1. Astrocytoma of spinal cord (HCC)  -     REFERRAL TO NEUROSURGERY  -     MRI [facility] SPINE W CONT; Future  -     MRI LUMB SPINE W CONT; Future    2. Hyperreflexia  -     REFERRAL TO NEUROSURGERY  -     MRI [facility] SPINE W CONT; Future  -     MRI LUMB SPINE W CONT; Future    3. Hip pain, acute, right  -     MRI LUMB SPINE W CONT; Future    4. Acute bilateral thoracic back pain  -     REFERRAL TO NEUROSURGERY  -     MRI [facility] SPINE W CONT; Future  -     methocarbamoL (ROBAXIN) 750 mg tablet; Take 1 Tab by mouth three (3) times daily as needed for Muscle Spasm(s) or Pain.  -     MRI LUMB SPINE W CONT; Future    5. Neuropathic pain of right foot           Medication risks/benefits/costs/interactions/alternatives discussed with patient.   Advised patient to call back or return to office if symptoms worsen/change/persist. If patient cannot reach us or should anything more severe/urgent arise he/she should proceed directly to the nearest emergency department. Discussed expected course/resolution/complications of diagnosis in detail with patient. Patient given a written after visit summary which includes her diagnoses, current medications and vitals. Patient expressed understanding with the diagnosis and plan. Follow-up and Dispositions    · Return if symptoms worsen or fail to improve.

## 2021-01-25 NOTE — PROGRESS NOTES
Chief Complaint   Patient presents with    LOW BACK PAIN     mid back    Hip Pain     right for 6 months     3 most recent PHQ Screens 1/25/2021   Little interest or pleasure in doing things Not at all   Feeling down, depressed, irritable, or hopeless Not at all   Total Score PHQ 2 0     Abuse Screening Questionnaire 1/25/2021   Do you ever feel afraid of your partner? N   Are you in a relationship with someone who physically or mentally threatens you? N   Is it safe for you to go home? Y     Visit Vitals  /79 (BP 1 Location: Right arm, BP Patient Position: Sitting)   Pulse 63   Temp (!) 96.6 °F (35.9 °C) (Temporal)   Resp 14   Ht 5' 6\" (1.676 m)   Wt 163 lb (73.9 kg)   SpO2 99%   BMI 26.31 kg/m²     1. Have you been to the ER, urgent care clinic since your last visit? Hospitalized since your last visit?no    2. Have you seen or consulted any other health care providers outside of the [address] [place] [address] since your last visit? Include any pap smears or colon screening.  no

## 2021-01-26 ENCOUNTER — TELEPHONE (OUTPATIENT)
Dept: PRIMARY CARE CLINIC | Age: 46
End: 2021-01-26

## 2021-01-26 DIAGNOSIS — M54.6 ACUTE BILATERAL THORACIC BACK PAIN: ICD-10-CM

## 2021-01-26 DIAGNOSIS — C72.0 ASTROCYTOMA OF SPINAL CORD (HCC): Primary | ICD-10-CM

## 2021-01-26 DIAGNOSIS — R29.2 HYPERREFLEXIA: ICD-10-CM

## 2021-01-26 NOTE — TELEPHONE ENCOUNTER
----- Message from Fortino Victoria sent at 1/25/2021  5:06 PM EST -----  Regarding: Dr. Nawaf Maciel Message/Vendor Calls    Caller's first and last name:  Jesus Crews Scheduling    Reason for call:  Requesting new order    Callback required yes/no and why:  yes    Best contact number(s):  [phone] will be in the office at 8:30 am     Details to clarify the request:  Requesting a new order for MRI of T spine. They are only able to schedule with \"with and without\" or just \"with out\".      Fortino Victoria

## 2021-01-29 ENCOUNTER — HOSPITAL ENCOUNTER (OUTPATIENT)
Dept: MRI IMAGING | Age: 46
Discharge: HOME | End: 2021-01-29
Attending: FAMILY MEDICINE
Payer: COMMERCIAL

## 2021-01-29 VITALS — WEIGHT: 160 LBS | BODY MASS INDEX: 25.82 KG/M2

## 2021-01-29 DIAGNOSIS — M54.6 ACUTE BILATERAL THORACIC BACK PAIN: ICD-10-CM

## 2021-01-29 DIAGNOSIS — R29.2 HYPERREFLEXIA: ICD-10-CM

## 2021-01-29 DIAGNOSIS — C72.0 ASTROCYTOMA OF SPINAL CORD (HCC): ICD-10-CM

## 2021-01-29 PROCEDURE — A9575 INJ GADOTERATE MEGLUMI 0.1ML: HCPCS | Performed by: FAMILY MEDICINE

## 2021-01-29 PROCEDURE — 72157 MRI CHEST SPINE W/O & W/DYE: CPT

## 2021-01-29 PROCEDURE — 74011250636 HC RX REV CODE- 250/636: Performed by: FAMILY MEDICINE

## 2021-01-29 PROCEDURE — 72158 MRI LUMBAR SPINE W/O & W/DYE: CPT

## 2021-01-29 RX ORDER — GADOTERATE MEGLUMINE 376.9 MG/ML
15 INJECTION INTRAVENOUS
Status: COMPLETED | OUTPATIENT
Start: 2021-01-29 | End: 2021-01-29

## 2021-01-29 RX ADMIN — GADOTERATE MEGLUMINE 15 ML: 376.9 INJECTION INTRAVENOUS at 09:33

## 2021-02-01 ENCOUNTER — TELEPHONE (OUTPATIENT)
Dept: PRIMARY CARE CLINIC | Age: 46
End: 2021-02-01

## 2021-02-05 NOTE — PROGRESS NOTES
Result discussed with patient. He reports that he already saw the Neurosurgeon Dr. Ewa Lofton on 1/19/2021 and discussed about the MRI report. No further management needed per patient.

## 2021-02-22 ENCOUNTER — TRANSCRIBE ORDER (OUTPATIENT)
Dept: SCHEDULING | Age: 46
End: 2021-02-22

## 2021-02-22 DIAGNOSIS — G95.9 MYELOPATHY (HCC): Primary | ICD-10-CM

## 2021-03-08 ENCOUNTER — HOSPITAL ENCOUNTER (OUTPATIENT)
Dept: MRI IMAGING | Age: 46
Discharge: HOME | End: 2021-03-08
Attending: NEUROLOGICAL SURGERY
Payer: COMMERCIAL

## 2021-03-08 DIAGNOSIS — G95.9 MYELOPATHY (HCC): ICD-10-CM

## 2021-03-08 PROCEDURE — 72141 MRI NECK SPINE W/O DYE: CPT

## 2021-04-21 NOTE — PROGRESS NOTES
Result discussed with patient. He reports that he already saw the Neurosurgeon Dr. Shawna Rodgers on 1/19/2021 and discussed about the MRI report. No further management needed per patient. Distance Of Undermining In Cm (Required): 1.5

## 2021-05-04 ENCOUNTER — OFFICE VISIT (OUTPATIENT)
Dept: PRIMARY CARE CLINIC | Age: 46
End: 2021-05-04
Payer: COMMERCIAL

## 2021-05-04 VITALS
WEIGHT: 161.2 LBS | HEIGHT: 66 IN | DIASTOLIC BLOOD PRESSURE: 72 MMHG | HEART RATE: 57 BPM | BODY MASS INDEX: 25.91 KG/M2 | TEMPERATURE: 97.3 F | SYSTOLIC BLOOD PRESSURE: 110 MMHG | OXYGEN SATURATION: 98 % | RESPIRATION RATE: 16 BRPM

## 2021-05-04 DIAGNOSIS — Z79.899 HIGH RISK MEDICATION USE: ICD-10-CM

## 2021-05-04 DIAGNOSIS — M79.2 NEUROPATHIC PAIN OF FOOT, RIGHT: Primary | ICD-10-CM

## 2021-05-04 LAB
ALBUMIN SERPL-MCNC: 3.9 G/DL (ref 3.5–5)
ALBUMIN/GLOB SERPL: 1.3 {RATIO} (ref 1.1–2.2)
ALP SERPL-CCNC: 127 U/L (ref 45–117)
ALT SERPL-CCNC: 28 U/L (ref 12–78)
AST SERPL-CCNC: 16 U/L (ref 15–37)
BASOPHILS # BLD: 0.1 K/UL (ref 0–0.1)
BASOPHILS NFR BLD: 1 % (ref 0–1)
BILIRUB DIRECT SERPL-MCNC: 0.1 MG/DL (ref 0–0.2)
BILIRUB SERPL-MCNC: 0.7 MG/DL (ref 0.2–1)
DIFFERENTIAL METHOD BLD: ABNORMAL
EOSINOPHIL # BLD: 0.1 K/UL (ref 0–0.4)
EOSINOPHIL NFR BLD: 2 % (ref 0–7)
ERYTHROCYTE [DISTWIDTH] IN BLOOD BY AUTOMATED COUNT: 12.3 % (ref 11.5–14.5)
GLOBULIN SER CALC-MCNC: 3 G/DL (ref 2–4)
HCT VFR BLD AUTO: 43.5 % (ref 36.6–50.3)
HGB BLD-MCNC: 14.9 G/DL (ref 12.1–17)
IMM GRANULOCYTES # BLD AUTO: 0.1 K/UL (ref 0–0.04)
IMM GRANULOCYTES NFR BLD AUTO: 1 % (ref 0–0.5)
LYMPHOCYTES # BLD: 1.8 K/UL (ref 0.8–3.5)
LYMPHOCYTES NFR BLD: 28 % (ref 12–49)
MCH RBC QN AUTO: 31.5 PG (ref 26–34)
MCHC RBC AUTO-ENTMCNC: 34.3 G/DL (ref 30–36.5)
MCV RBC AUTO: 92 FL (ref 80–99)
MONOCYTES # BLD: 0.9 K/UL (ref 0–1)
MONOCYTES NFR BLD: 14 % (ref 5–13)
NEUTS SEG # BLD: 3.6 K/UL (ref 1.8–8)
NEUTS SEG NFR BLD: 54 % (ref 32–75)
NRBC # BLD: 0 K/UL (ref 0–0.01)
NRBC BLD-RTO: 0 PER 100 WBC
PLATELET # BLD AUTO: 273 K/UL (ref 150–400)
PMV BLD AUTO: 10 FL (ref 8.9–12.9)
PROT SERPL-MCNC: 6.9 G/DL (ref 6.4–8.2)
RBC # BLD AUTO: 4.73 M/UL (ref 4.1–5.7)
WBC # BLD AUTO: 6.5 K/UL (ref 4.1–11.1)

## 2021-05-04 PROCEDURE — 99213 OFFICE O/P EST LOW 20 MIN: CPT | Performed by: FAMILY MEDICINE

## 2021-05-04 RX ORDER — OXCARBAZEPINE 300 MG/1
TABLET, FILM COATED ORAL
Qty: 30 TAB | Refills: 4 | Status: SHIPPED | OUTPATIENT
Start: 2021-05-04 | End: 2021-07-27

## 2021-05-04 NOTE — PROGRESS NOTES
Subjective:     Chief Complaint   Patient presents with    Medication Refill        He  is a 54y.o. year old male who presents today for medication refill. He has been taking Trileptal off and on for chronic foot/leg pain. He has been taking this med for many years as needed for right foot pain. Reports that he has been experiencing the pain since he has the surgery in his thoracic spine when he was 7 yrs old. He was seen by [facility] and after trial of different med Trileptal worked better. Patient has been following up with orthopedics for right hip and back pain. He has completed physical therapy. He notes no improvement. Pertinent items are noted in HPI. Objective:     Vitals:    05/04/21 0902   BP: 110/72   Pulse: (!) 57   Resp: 16   Temp: 97.3 °F (36.3 °C)   TempSrc: Temporal   SpO2: 98%   Weight: 161 lb 3.2 oz (73.1 kg)   Height: 5' 6\" (1.676 m)       Physical Examination: General appearance - alert, well appearing, and in no distress, oriented to person, place, and time and overweight  Mental status - alert, oriented to person, place, and time, normal mood, behavior, speech, dress, motor activity, and thought processes  Chest - clear to auscultation, no wheezes, rales or rhonchi, symmetric air entry  Heart - normal rate, regular rhythm, normal S1, S2, no murmurs, rubs, clicks or gallops  Neurological - limping when walking.      Allergies   Allergen Reactions    Clindamycin Rash      Social History     Socioeconomic History    Marital status: SINGLE     Spouse name: Not on file    Number of children: Not on file    Years of education: Not on file    Highest education level: Not on file   Tobacco Use    Smoking status: Never Smoker    Smokeless tobacco: Never Used   Substance and Sexual Activity    Alcohol use: Yes     Comment: socially    Drug use: No    Sexual activity: Not Currently      Family History   Problem Relation Age of Onset    Stroke Father     Hypertension Father     Cancer Maternal Grandfather     Stroke Paternal Grandmother       Past Surgical History:   Procedure Laterality Date    HX ORTHOPAEDIC      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 H/O radiation treatment . 1985    HX OTHER SURGICAL  8/11/13    I&D of buttocks abscess x3 by Dr Nino Dominguez UNLISTED      Inguinal hernia repair      Past Medical History:   Diagnosis Date    MRSA infection     Other ill-defined conditions(799.89)     neurological pain s/p back surgery      Current Outpatient Medications   Medication Sig Dispense Refill    OXcarbazepine (TRILEPTAL) 300 mg tablet TAKE 1 TABLET BY MOUTH EVERY DAY 30 Tab 2    methocarbamoL (ROBAXIN) 750 mg tablet Take 1 Tab by mouth three (3) times daily as needed for Muscle Spasm(s) or Pain. 30 Tab 0        Assessment/ Plan:   Diagnoses and all orders for this visit:    1. Neuropathic pain of foot, right  -     OXcarbazepine (TRILEPTAL) 300 mg tablet; TAKE 1 TABLET BY MOUTH EVERY DAY  -     CBC WITH AUTOMATED DIFF; Future  -     HEPATIC FUNCTION PANEL; Future    2. High risk medication use  -     CBC WITH AUTOMATED DIFF; Future  -     HEPATIC FUNCTION PANEL; Future       Patient will be following up with his orthopedics for follow up on right hip. Medication risks/benefits/costs/interactions/alternatives discussed with patient. Advised patient to call back or return to office if symptoms worsen/change/persist. If patient cannot reach us or should anything more severe/urgent arise he/she should proceed directly to the nearest emergency department. Discussed expected course/resolution/complications of diagnosis in detail with patient. Patient given a written after visit summary which includes her diagnoses, current medications and vitals. Patient expressed understanding with the diagnosis and plan.        Follow-up and Dispositions    · Return in about 4 months (around 9/4/2021), or if symptoms worsen or fail to improve, for complete physical  and fasting blood work., have fasting blood work done 2-3 days prior. Julissa Dye

## 2021-05-04 NOTE — PROGRESS NOTES
Identified pt with two pt identifiers(name and ). Chief Complaint   Patient presents with    Medication Refill        3 most recent PHQ Screens 2021   Little interest or pleasure in doing things Not at all   Feeling down, depressed, irritable, or hopeless Not at all   Total Score PHQ 2 0        Vitals:    21 0902   BP: 110/72   Pulse: (!) 57   Resp: 16   Temp: 97.3 °F (36.3 °C)   TempSrc: Temporal   SpO2: 98%   Weight: 161 lb 3.2 oz (73.1 kg)   Height: 5' 6\" (1.676 m)   PainSc:   0 - No pain       Health Maintenance Due   Topic    Hepatitis C Screening     DTaP/Tdap/Td series (2 - Td)       1. Have you been to the ER, urgent care clinic since your last visit? Hospitalized since your last visit? No    2. Have you seen or consulted any other health care providers outside of the [address] [place] [address] since your last visit? Include any pap smears or colon screening.  No

## 2021-05-05 NOTE — PROGRESS NOTES
Sent via my chart. Good morning! Your labs looks good. CBC, liver function is fine. Thanks.   Dr. Fredrick Novoa

## 2021-11-09 DIAGNOSIS — M79.2 NEUROPATHIC PAIN OF FOOT, RIGHT: ICD-10-CM

## 2021-11-09 RX ORDER — OXCARBAZEPINE 300 MG/1
TABLET, FILM COATED ORAL
Qty: 90 TABLET | Refills: 0 | Status: SHIPPED | OUTPATIENT
Start: 2021-11-09 | End: 2022-02-01

## 2022-03-20 PROBLEM — M79.2 NEUROPATHIC PAIN OF FOOT: Status: ACTIVE | Noted: 2017-06-15

## 2022-03-20 PROBLEM — C72.0: Status: ACTIVE | Noted: 2021-01-25

## 2022-03-27 ENCOUNTER — HOSPITAL ENCOUNTER (EMERGENCY)
Age: 47
Discharge: HOME | End: 2022-03-27
Attending: EMERGENCY MEDICINE | Admitting: EMERGENCY MEDICINE
Payer: COMMERCIAL

## 2022-03-27 VITALS
WEIGHT: 145.5 LBS | BODY MASS INDEX: 23.38 KG/M2 | DIASTOLIC BLOOD PRESSURE: 81 MMHG | HEIGHT: 66 IN | TEMPERATURE: 98.1 F | SYSTOLIC BLOOD PRESSURE: 153 MMHG | OXYGEN SATURATION: 98 % | RESPIRATION RATE: 20 BRPM | HEART RATE: 82 BPM

## 2022-03-27 DIAGNOSIS — L02.91 ABSCESS: Primary | ICD-10-CM

## 2022-03-27 PROCEDURE — 75810000289 HC I&D ABSCESS SIMP/COMP/MULT

## 2022-03-27 PROCEDURE — 99283 EMERGENCY DEPT VISIT LOW MDM: CPT

## 2022-03-27 PROCEDURE — 74011000250 HC RX REV CODE- 250: Performed by: STUDENT IN AN ORGANIZED HEALTH CARE EDUCATION/TRAINING PROGRAM

## 2022-03-27 RX ORDER — SULFAMETHOXAZOLE AND TRIMETHOPRIM 800; 160 MG/1; MG/1
1 TABLET ORAL 2 TIMES DAILY
Qty: 14 TABLET | Refills: 0 | Status: SHIPPED | OUTPATIENT
Start: 2022-03-27 | End: 2022-04-03

## 2022-03-27 RX ORDER — LIDOCAINE HYDROCHLORIDE 10 MG/ML
5 INJECTION INFILTRATION; PERINEURAL ONCE
Status: COMPLETED | OUTPATIENT
Start: 2022-03-27 | End: 2022-03-27

## 2022-03-27 RX ADMIN — LIDOCAINE HYDROCHLORIDE 5 ML: 10 INJECTION, SOLUTION INFILTRATION; PERINEURAL at 11:34

## 2022-03-27 NOTE — DISCHARGE INSTRUCTIONS
Take new medication as prescribed. Avoid getting area wet for the next 48 hours and follow-up with PCP when able. Return with any changes or worsening.

## 2022-03-27 NOTE — ED PROVIDER NOTES
59-year-old male with history of MRSA infection presents to ED with 4 days of abscess on left buttocks. Patient reports that he first noticed this about 4 days ago and has gotten more painful since. He reports that it has been draining some clear/ bloody fluid a couple days ago. He has been bandaging this area but decided to come in due to his history of MRSA. He has tried neosporin with no relief. He has no history of similar episodes. Denies any fevers, chills, cough, CP, SOB, N/V/D.      PMH: MRSA infection  PSHx: Astrocytoma spinal cord removal  Social: No tobacco use, occasional EtOH use, no drug use    The history is provided by the patient. Skin Problem  Pertinent negatives include no chest pain and no headaches. 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 H/O radiation treatment .  1985    HX OTHER SURGICAL  8/11/13    I&D of buttocks abscess x3 by Dr Werner Lozano      Inguinal hernia repair         Family History:   Problem Relation Age of Onset    Stroke Father     Hypertension Father     Cancer Maternal Grandfather     Stroke Paternal Grandmother        Social History     Socioeconomic History    Marital status: SINGLE     Spouse name: Not on file    Number of children: Not on file    Years of education: Not on file    Highest education level: Not on file   Occupational History    Not on file   Tobacco Use    Smoking status: Never Smoker    Smokeless tobacco: Never Used   Vaping Use    Vaping Use: Never used   Substance and Sexual Activity    Alcohol use: Yes     Comment: socially    Drug use: No    Sexual activity: Not Currently   Other Topics Concern    Not on file   Social History Narrative    Not on file     Social Determinants of Health     Financial Resource Strain:     Difficulty of Paying Living Expenses: Not on file   Food Insecurity:     Worried About Running Out of Food in the Last Year: Not on file    [place]atum of Food in the Last Year: Not on file   Transportation Needs:     Lack of Transportation (Medical): Not on file    Lack of Transportation (Non-Medical): Not on file   Physical Activity:     Days of Exercise per Week: Not on file    Minutes of Exercise per Session: Not on file   Stress:     Feeling of Stress : Not on file   Social Connections:     Frequency of Communication with Friends and Family: Not on file    Frequency of Social Gatherings with Friends and Family: Not on file    Attends Worship Services: Not on file    Active Member of [address] [place] [address] [employer] or Organizations: Not on file    Attends Club or Organization Meetings: Not on file    Marital Status: Not on file   Intimate Partner Violence:     Fear of Current or Ex-Partner: Not on file    Emotionally Abused: Not on file    Physically Abused: Not on file    Sexually Abused: Not on file   Housing Stability:     Unable to Pay for Housing in the Last Year: Not on file    Number of [place] in the Last Year: Not on file    Unstable Housing in the Last Year: Not on file         ALLERGIES: Clindamycin    Review of Systems   Constitutional: Negative for fever. HENT: Negative for congestion and sinus pain. Respiratory: Negative for cough. Cardiovascular: Negative for chest pain. Gastrointestinal: Negative for nausea and vomiting. Genitourinary: Negative for dysuria. Musculoskeletal: Negative for myalgias. Skin: Positive for color change. Neurological: Negative for headaches. Hematological: Negative for adenopathy. All other systems reviewed and are negative.       Vitals:    03/27/22 1112   BP: (!) 153/81   Pulse: 82   Resp: 20   Temp: 98.1 °F (36.7 °C)   SpO2: 98%   Weight: 66 kg (145 lb 8.1 oz)   Height: 5' 6\" (1.676 m)            Physical Exam  Vitals and nursing note reviewed. Constitutional:       Appearance: Normal appearance. Eyes:      Extraocular Movements: Extraocular movements intact. Pupils: Pupils are equal, round, and reactive to light. Cardiovascular:      Rate and Rhythm: Normal rate and regular rhythm. Heart sounds: Normal heart sounds. Pulmonary:      Effort: Pulmonary effort is normal.      Breath sounds: Normal breath sounds. Abdominal:      Palpations: Abdomen is soft. Tenderness: There is no abdominal tenderness. Lymphadenopathy:      Cervical: No cervical adenopathy. Skin:     General: Skin is warm and dry. Findings: Abscess (to left buttocks with associated erythema and minor purulent drainage) present. Neurological:      General: No focal deficit present. Mental Status: He is alert and oriented to person, place, and time. Psychiatric:         Mood and Affect: Mood normal.         Behavior: Behavior normal.          MDM  Number of Diagnoses or Management Options  Abscess  Diagnosis management comments: 58-year-old male with history of MRSA infection presents to ED with 4 days of abscess on left buttocks. Vital signs stable in triage and patient afebrile. Physical exam notable for abscess to left buttocks with associated erythema and mild purulent drainage. Abscess drained as outlined below. Bandaged and patient will be discharged with prescription for antibiotic, instructions for conservative care, follow-up and return precautions.        Amount and/or Complexity of Data Reviewed  Discuss the patient with other providers: yes           I&D Abcess Simple    Date/Time: 3/27/2022 2:01 PM  Performed by: TONIA Blount  Authorized by: TONIA Blount     Consent:     Consent obtained:  Verbal    Consent given by:  Patient    Risks discussed:  Bleeding, incomplete drainage, pain and infection    Alternatives discussed:  No treatment  Location:     Type:  Abscess    Size:  3 cm Location:  Anogenital    Anogenital location:  Perirectal  Pre-procedure details:     Skin preparation:  Betadine  Anesthesia (see MAR for exact dosages): Anesthesia method:  Local infiltration    Local anesthetic:  Lidocaine 1% w/o epi  Procedure type:     Complexity:  Simple  Procedure details:     Needle aspiration: no      Incision types:  Single straight    Incision depth:  Dermal    Scalpel blade:  11    Wound management:  Probed and deloculated and irrigated with saline    Drainage:  Bloody and purulent    Drainage amount: Moderate    Wound treatment:  Wound left open    Packing materials:  None  Post-procedure details:     Patient tolerance of procedure:   Tolerated well, no immediate complications

## 2022-04-15 ENCOUNTER — HOSPITAL ENCOUNTER (EMERGENCY)
Age: 47
Discharge: HOME | End: 2022-04-15
Attending: EMERGENCY MEDICINE | Admitting: EMERGENCY MEDICINE
Payer: COMMERCIAL

## 2022-04-15 VITALS
HEIGHT: 66 IN | OXYGEN SATURATION: 98 % | TEMPERATURE: 97.7 F | HEART RATE: 98 BPM | RESPIRATION RATE: 16 BRPM | BODY MASS INDEX: 23.3 KG/M2 | WEIGHT: 145 LBS | DIASTOLIC BLOOD PRESSURE: 82 MMHG | SYSTOLIC BLOOD PRESSURE: 155 MMHG

## 2022-04-15 DIAGNOSIS — L02.31 ABSCESS OF BUTTOCK, RIGHT: Primary | ICD-10-CM

## 2022-04-15 DIAGNOSIS — Z86.14 HISTORY OF MRSA INFECTION: ICD-10-CM

## 2022-04-15 PROCEDURE — 74011250637 HC RX REV CODE- 250/637: Performed by: NURSE PRACTITIONER

## 2022-04-15 PROCEDURE — 99283 EMERGENCY DEPT VISIT LOW MDM: CPT

## 2022-04-15 RX ORDER — MUPIROCIN 20 MG/G
OINTMENT TOPICAL DAILY
Status: DISCONTINUED | OUTPATIENT
Start: 2022-04-16 | End: 2022-04-15

## 2022-04-15 RX ORDER — DOXYCYCLINE HYCLATE 100 MG
100 TABLET ORAL
Status: COMPLETED | OUTPATIENT
Start: 2022-04-15 | End: 2022-04-15

## 2022-04-15 RX ORDER — DOXYCYCLINE HYCLATE 100 MG
100 TABLET ORAL 2 TIMES DAILY
Qty: 20 TABLET | Refills: 0 | Status: SHIPPED | OUTPATIENT
Start: 2022-04-15 | End: 2022-04-25

## 2022-04-15 RX ORDER — NEOMYCIN-BACITRACN ZN-POLYMYX 3.5 MG-400 UNIT-5,000 UNIT/GRAM TOP OINT
OINTMENT TOPICAL 3 TIMES DAILY
Qty: 28 G | Refills: 0 | Status: SHIPPED | OUTPATIENT
Start: 2022-04-15 | End: 2022-04-25

## 2022-04-15 RX ADMIN — DOXYCYCLINE HYCLATE 100 MG: 100 TABLET, COATED ORAL at 17:39

## 2022-04-15 NOTE — ED PROVIDER NOTES
HPI     Carri Aguilera is a 51 y.o. male with Hx of MRSA, neurologic pain post back surgery who presents ambulatory to [facility] ED with cc of R buttock abscess. Patient reports that he was recently seen for a left buttock abscess in the emergency department, completed his antibiotic course with some improvement. He states that he now has redness and swelling on the right buttock since Tuesday of this week. He denies any recent trauma, injuries, falls. He states that he does have a history of MRSA. He reports the wound is using blood and pus. He has not seen general surgery for his abscesses. Denies F/C, body aches, N/V/D, cough, congestion, CP, SOB,urinary concerns. Denies tobacco/ ETOH/ substance abuse. Denies COVID/ Flu hx. PCP: Bety Morrell MD    There are no other complaints, changes or physical findings at this time. 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 H/O radiation treatment .  1985    HX OTHER SURGICAL  8/11/13    I&D of buttocks abscess x3 by Dr Fabio Gregory      Inguinal hernia repair         Family History:   Problem Relation Age of Onset    Stroke Father     Hypertension Father     Cancer Maternal Grandfather     Stroke Paternal Grandmother        Social History     Socioeconomic History    Marital status: SINGLE     Spouse name: Not on file    Number of children: Not on file    Years of education: Not on file    Highest education level: Not on file   Occupational History    Not on file   Tobacco Use    Smoking status: Never Smoker    Smokeless tobacco: Never Used   Vaping Use    Vaping Use: Never used   Substance and Sexual Activity    Alcohol use: Yes     Comment: socially    Drug use: No    Sexual activity: Not Currently Other Topics Concern    Not on file   Social History Narrative    Not on file     Social Determinants of Health     Financial Resource Strain:     Difficulty of Paying Living Expenses: Not on file   Food Insecurity:     Worried About Running Out of Food in the Last Year: Not on file    Tatum of Food in the Last Year: Not on file   Transportation Needs:     Lack of Transportation (Medical): Not on file    Lack of Transportation (Non-Medical): Not on file   Physical Activity:     Days of Exercise per Week: Not on file    Minutes of Exercise per Session: Not on file   Stress:     Feeling of Stress : Not on file   Social Connections:     Frequency of Communication with Friends and Family: Not on file    Frequency of Social Gatherings with Friends and Family: Not on file    Attends Christian Services: Not on file    Active Member of [address] [place] [address] or Organizations: Not on file    Attends Club or Organization Meetings: Not on file    Marital Status: Not on file   Intimate Partner Violence:     Fear of Current or Ex-Partner: Not on file    Emotionally Abused: Not on file    Physically Abused: Not on file    Sexually Abused: Not on file   Housing Stability:     Unable to Pay for Housing in the Last Year: Not on file    Number of [place] in the Last Year: Not on file    Unstable Housing in the Last Year: Not on file         ALLERGIES: Clindamycin    Review of Systems   Constitutional: Negative for activity change, appetite change, chills and fever. HENT: Negative for congestion, rhinorrhea and sore throat. Eyes: Negative for visual disturbance. Respiratory: Negative for cough and shortness of breath. Cardiovascular: Negative for chest pain. Gastrointestinal: Negative for abdominal pain, diarrhea, nausea and vomiting. Genitourinary: Negative for dysuria, flank pain, frequency and urgency.    Musculoskeletal: Negative for arthralgias, back pain, gait problem, joint swelling, myalgias and neck pain. Skin: Positive for color change and wound. Negative for rash. Neurological: Negative for dizziness, weakness, light-headedness and headaches. Psychiatric/Behavioral: Negative for agitation, behavioral problems and confusion. All other systems reviewed and are negative. Vitals:    04/15/22 1649 04/15/22 1733 04/15/22 1734   BP: (!) 155/82     Pulse: 98     Resp: 16     Temp: 97.7 °F (36.5 °C)     SpO2: 98% 98%    Weight:   65.8 kg (145 lb)   Height:   5' 6\" (1.676 m)            Physical Exam  Vitals and nursing note reviewed. Exam conducted with a chaperone present Pablo Warner ). Constitutional:       General: He is not in acute distress. Appearance: He is well-developed. HENT:      Head: Normocephalic and atraumatic. Right Ear: External ear normal.      Left Ear: External ear normal.   Eyes:      Conjunctiva/sclera: Conjunctivae normal.      Pupils: Pupils are equal, round, and reactive to light. Cardiovascular:      Rate and Rhythm: Normal rate and regular rhythm. Heart sounds: Normal heart sounds. Pulmonary:      Effort: Pulmonary effort is normal.      Breath sounds: Normal breath sounds. Abdominal:      Palpations: Abdomen is soft. Tenderness: There is no abdominal tenderness. Musculoskeletal:         General: Normal range of motion. Cervical back: Normal range of motion and neck supple. Skin:     General: Skin is warm and dry. Findings: Erythema (localized region of induration on the R buttock; no fluctuance; draining scant white pus ) present. Neurological:      Mental Status: He is alert and oriented to person, place, and time. Psychiatric:         Behavior: Behavior normal.         Thought Content:  Thought content normal.         Judgment: Judgment normal.          MDM  Number of Diagnoses or Management Options  Abscess of buttock, right  History of MRSA infection  Diagnosis management comments: DDx: cellulitis, abscess     Patient presents the emergency department with right buttock abscess. He was started on doxycycline for ongoing management, given his history of MRSA. Given the recurrence of symptoms, he was advised to follow-up with general surgery for ongoing management. There is no fluctuance, only induration, the wound is draining. We have discussed wound care management for home, patient is using Hibiclens. Reasons to return to ED provided at this time. Amount and/or Complexity of Data Reviewed  Review and summarize past medical records: yes           Procedures    LABORATORY TESTS:  No results found for this or any previous visit (from the past 12 hour(s)). IMAGING RESULTS:  No orders to display       MEDICATIONS GIVEN:  Medications   doxycycline (VIBRA-TABS) tablet 100 mg (100 mg Oral Given 4/15/22 4801)       IMPRESSION:  1. Abscess of buttock, right    2. History of MRSA infection        PLAN:  1. Discharge Medication List as of 4/15/2022  5:51 PM        2. Follow-up Information     Follow up With Specialties Details Why Contact Info    [address] [facility] Emergency Medicine Go to  As needed, If symptoms worsen [address] [facility] Surgery Schedule an appointment as soon as possible for a visit   [address] [id number] [phone]        3.  Return to ED if worse

## 2022-04-15 NOTE — DISCHARGE INSTRUCTIONS
Continue to use hibiclens to wash your skin; apply warm compresses to help reduce the hardened skin caused by inflammation from skin infection   Return to the ER for any worsening or worrisome symptoms   Take Motrin or Tylenol for pain management   Please follow up with general surgery, call Monday for an appointment

## 2022-04-15 NOTE — ED TRIAGE NOTES
He reports an abscess right buttock. He recently had the same on the left side. He has hx of MRSA. He says it might have started Sunday but he really noticed it Tuesday.

## 2022-04-26 ENCOUNTER — OFFICE VISIT (OUTPATIENT)
Dept: PRIMARY CARE CLINIC | Age: 47
End: 2022-04-26
Payer: COMMERCIAL

## 2022-04-26 VITALS
BODY MASS INDEX: 25.71 KG/M2 | SYSTOLIC BLOOD PRESSURE: 128 MMHG | WEIGHT: 160 LBS | TEMPERATURE: 97.1 F | HEART RATE: 74 BPM | OXYGEN SATURATION: 96 % | DIASTOLIC BLOOD PRESSURE: 83 MMHG | RESPIRATION RATE: 16 BRPM | HEIGHT: 66 IN

## 2022-04-26 DIAGNOSIS — M79.651 MUSCULOSKELETAL PAIN OF RIGHT THIGH: ICD-10-CM

## 2022-04-26 DIAGNOSIS — Z79.899 MEDICATION MANAGEMENT: ICD-10-CM

## 2022-04-26 DIAGNOSIS — L02.31 ABSCESS OF BUTTOCK, RIGHT: ICD-10-CM

## 2022-04-26 DIAGNOSIS — G89.29 CHRONIC NEUROPATHIC PAIN: Primary | ICD-10-CM

## 2022-04-26 DIAGNOSIS — M79.2 CHRONIC NEUROPATHIC PAIN: Primary | ICD-10-CM

## 2022-04-26 DIAGNOSIS — Z86.14 HISTORY OF MRSA INFECTION: ICD-10-CM

## 2022-04-26 PROCEDURE — 99214 OFFICE O/P EST MOD 30 MIN: CPT | Performed by: NURSE PRACTITIONER

## 2022-04-26 RX ORDER — OXCARBAZEPINE 300 MG/1
450 TABLET, FILM COATED ORAL DAILY
Qty: 45 TABLET | Refills: 2 | Status: SHIPPED | OUTPATIENT
Start: 2022-04-26 | End: 2022-07-21

## 2022-04-26 RX ORDER — CYCLOBENZAPRINE HCL 5 MG
5 TABLET ORAL
Qty: 30 TABLET | Refills: 1 | Status: SHIPPED | OUTPATIENT
Start: 2022-04-26

## 2022-04-26 NOTE — PROGRESS NOTES
[facility]   Samishharpal Cageli [address]  P: [phone]  F: [phone]    SUBJECTIVE     HPI:     Anh Dahl is a 51 y.o. male who is seen in the clinic for   Chief Complaint   Patient presents with   [address]      Patient was recently seen in The ED for an abscess of his right buttock. He was prescribed Doxycyline d/t recurrence of abscess and hx of MRSA. Also, he was told to follow-up with General Surgery for future management. Today, the patient believes that his neuropathic pain is worsening in his bilateral feet. He notices that it is particularly worse at night, while lying in bed. He has been prescribed Trileptal 300 mg once daily for management of Chronic Neuropathic Pain and would like a dosage increase. The patient also has noticed intermittent right lateral thigh pain that is worse after working \"on my feet at my job\". He states that he messages the area and usually the pain goes away. Patient Active Problem List    Diagnosis    Astrocytoma of spinal cord ([facility] [address]    Neuropathic pain of foot    Stricture esophagus    Abscess of buttock        Past Medical History:   Diagnosis Date    MRSA infection     Other ill-defined conditions(999.89)     neurological pain s/p back surgery     Past Surgical History:   Procedure Laterality Date    HX ORTHOPAEDIC      tumor removed from thoracic spine. H/O radiation treatment .  1985    HX OTHER SURGICAL  8/11/13    I&D of buttocks abscess x3 by Dr Eric Busch      Inguinal hernia repair     Social History     Socioeconomic History    Marital status: SINGLE     Spouse name: Not on file    Number of children: Not on file    Years of education: Not on file    Highest education level: Not on file   Occupational History    Not on file   Tobacco Use    Smoking status: Never Smoker    Smokeless tobacco: Never Used   Vaping Use    Vaping Use: Never used   Substance and Sexual Activity    Alcohol use: Yes     Comment: socially    Drug use: No    Sexual activity: Not Currently   Other Topics Concern    Not on file   Social History Narrative    Not on file     Social Determinants of Health     Financial Resource Strain:     Difficulty of Paying Living Expenses: Not on file   Food Insecurity:     Worried About Running Out of Food in the Last Year: Not on file    Tatum of Food in the Last Year: Not on file   Transportation Needs:     Lack of Transportation (Medical): Not on file    Lack of Transportation (Non-Medical): Not on file   Physical Activity: Insufficiently Active    Days of Exercise per Week: 2 days    Minutes of Exercise per Session: 30 min   Stress:     Feeling of Stress : Not on file   Social Connections:     Frequency of Communication with Friends and Family: Not on file    Frequency of Social Gatherings with Friends and Family: Not on file    Attends Congregation Services: Not on file    Active Member of Clubs or Organizations: Not on file    Attends Club or Organization Meetings: Not on file    Marital Status: Not on file   Intimate Partner Violence: Not At Risk    Fear of Current or Ex-Partner: No    Emotionally Abused: No    Physically Abused: No    Sexually Abused: No   Housing Stability:     Unable to Pay for Housing in the Last Year: Not on file    Number of [place] in the Last Year: Not on file    Unstable Housing in the Last Year: Not on file     Family History   Problem Relation Age of Onset    Stroke Father     Hypertension Father     Cancer Maternal Grandfather     Stroke Paternal Grandmother      Immunization History   Administered Date(s) Administered    COVID-19AURELIA PF, 0.5 mL Dose 04/12/2021    COVID-19, Vincenzo Cardona top, DILUTE for use, 12+ yrs, 30mcg/0.3mL dose 01/02/2022    Tdap 02/28/2007      Allergies   Allergen Reactions    Clindamycin Rash       No visits with results within 3 Month(s) from this visit. Latest known visit with results is:   Office Visit on 05/04/2021   Component Date Value Ref Range Status    Protein, total 05/04/2021 6.9  6.4 - 8.2 g/dL Final    Albumin 05/04/2021 3.9  3.5 - 5.0 g/dL Final    Globulin 05/04/2021 3.0  2.0 - 4.0 g/dL Final    A-G Ratio 05/04/2021 1.3  1.1 - 2.2   Final    Bilirubin, total 05/04/2021 0.7  0.2 - 1.0 MG/DL Final    Bilirubin, direct 05/04/2021 0.1  0.0 - 0.2 MG/DL Final    Alk. phosphatase 05/04/2021 127* 45 - 117 U/L Final    AST (SGOT) 05/04/2021 16  15 - 37 U/L Final    ALT (SGPT) 05/04/2021 28  12 - 78 U/L Final    WBC 05/04/2021 6.5  4.1 - 11.1 K/uL Final    RBC 05/04/2021 4.73  4.10 - 5.70 M/uL Final    HGB 05/04/2021 14.9  12.1 - 17.0 g/dL Final    HCT 05/04/2021 43.5  36.6 - 50.3 % Final    MCV 05/04/2021 92.0  80.0 - 99.0 FL Final    MCH 05/04/2021 31.5  26.0 - 34.0 PG Final    MCHC 05/04/2021 34.3  30.0 - 36.5 g/dL Final    RDW 05/04/2021 12.3  11.5 - 14.5 % Final    PLATELET 52/51/5276 728  150 - 400 K/uL Final    MPV 05/04/2021 10.0  8.9 - 12.9 FL Final    NRBC 05/04/2021 0.0  0  WBC Final    ABSOLUTE NRBC 05/04/2021 0.00  0.00 - 0.01 K/uL Final    NEUTROPHILS 05/04/2021 54  32 - 75 % Final    LYMPHOCYTES 05/04/2021 28  12 - 49 % Final    MONOCYTES 05/04/2021 14* 5 - 13 % Final    EOSINOPHILS 05/04/2021 2  0 - 7 % Final    BASOPHILS 05/04/2021 1  0 - 1 % Final    IMMATURE GRANULOCYTES 05/04/2021 1* 0.0 - 0.5 % Final    ABS. NEUTROPHILS 05/04/2021 3.6  1.8 - 8.0 K/UL Final    ABS. LYMPHOCYTES 05/04/2021 1.8  0.8 - 3.5 K/UL Final    ABS. MONOCYTES 05/04/2021 0.9  0.0 - 1.0 K/UL Final    ABS. EOSINOPHILS 05/04/2021 0.1  0.0 - 0.4 K/UL Final    ABS. BASOPHILS 05/04/2021 0.1  0.0 - 0.1 K/UL Final    ABS. IMM.  GRANS. 05/04/2021 0.1* 0.00 - 0.04 K/UL Final    DF 05/04/2021 AUTOMATED    Final      MRI CERV SPINE WO CONT  Narrative: ADDITIONAL INDICATION:  Neck and back pain, previous thoracic surgery for tumor,  followed by radiation treatment in 1985. EXAM: MRI CERVICAL SPINE WITHOUT CONTRAST. Sagittal T1-weighted images, sagittal  turbo spin-echo T2-weighted images, sagittal STIR images, axial T1-weighted  images, axial T2 star weighted images. COMPARISON:  None  . FINDINGS:   Bony alignment is normal  . Bone signal intensity is within normal limits for  age . Vertebral body heights and disc space heights  are preserved. .  The  cervical spinal cord is intrinsically normal in signal intensity and anatomy. The craniocervical junction and prevertebral soft tissue space are normal.    C2-3: Mild relative left foraminal narrowing. C3-4: Mild left foraminal stenosis. C4-5: Mild to moderate left foraminal stenosis. Uncal hypertrophy. C5-6: Broad spondylosis and bulge, Central focal protrusion, mild central  stenosis with cord effacement. . Moderately severe bilateral foraminal stenosis  with uncal hypertrophy. C6-7: No herniation or stenosis. Mild bilateral foraminal stenosis. C7-T1: No herniation or central stenosis. Impression: 1. C6-7: Mild bilateral foraminal stenosis. 2. C5-6: Broad bulge, central focal protrusion, mild central stenosis with cord  effacement. Moderate bilateral foraminal stenosis. 3. C4-5: Mild to moderate left foraminal stenosis. 4. C3-4: Mild left foraminal stenosis. 5. C2-3: Mild relative left foraminal narrowing. .. Current Outpatient Medications   Medication Sig Dispense Refill    OXcarbazepine (TRILEPTAL) 300 mg tablet Take 1.5 Tablets by mouth daily. 45 Tablet 2    cyclobenzaprine (FLEXERIL) 5 mg tablet Take 1 Tablet by mouth three (3) times daily as needed for Muscle Spasm(s). 30 Tablet 1           The past medical history, past surgical history, and family history were reviewed and updated in the medical record. Lab values/Imaging were reviewed. The medications were reviewed and updated in the medical record.    Immunizations were reviewed and updated in the medical record. All relevant preventative screenings reviewed and updated in the medical record. REVIEW OF SYSTEMS   Review of Systems   Constitutional: Negative for malaise/fatigue. Respiratory: Negative for shortness of breath and wheezing. Cardiovascular: Negative for chest pain and palpitations. Musculoskeletal: Negative for back pain and falls. Neurological: Positive for sensory change. Negative for dizziness, tingling, speech change, focal weakness, weakness and headaches. PHYSICAL EXAM   /83 (BP 1 Location: Left upper arm, BP Patient Position: Sitting, BP Cuff Size: Adult)   Pulse 74   Temp 97.1 °F (36.2 °C) (Temporal)   Resp 16   Ht 5' 6\" (1.676 m)   Wt 160 lb (72.6 kg)   SpO2 96%   BMI 25.82 kg/m²      Physical Exam  Constitutional:       General: He is not in acute distress. Appearance: Normal appearance. He is not ill-appearing or diaphoretic. Cardiovascular:      Rate and Rhythm: Normal rate and regular rhythm. Pulses: Normal pulses. Heart sounds: Normal heart sounds. No murmur heard. Pulmonary:      Effort: Pulmonary effort is normal. No respiratory distress. Breath sounds: Normal breath sounds. No wheezing. Musculoskeletal:         General: No swelling, tenderness or deformity. Neurological:      Mental Status: He is alert. Sensory: No sensory deficit. Motor: Weakness present. Gait: Gait abnormal.            ASSESSMENT/ PLAN   Below is the assessment and plan developed based on review of pertinent history, physical exam, labs, studies, and medications. 1. Chronic neuropathic pain  Discussed with patient to make an appointment with Neurology to explore different treatment measures for Neuropathic Pain if dosage increase of Trileptal does not relieve his pain.   -     OXcarbazepine (TRILEPTAL) 300 mg tablet; Take 1.5 Tablets by mouth daily. , Normal, Disp-45 Tablet, R-2Need appointment.  -     REFERRAL TO NEUROLOGY  -     [facility] WITH AUTOMATED DIFF; Future  -     VITAMIN B12 & FOLATE; Future  2. Musculoskeletal pain of right thigh  Discussed with patient to get X-ray if pain is not relieved with Flexeril. -     cyclobenzaprine (FLEXERIL) 5 mg tablet; Take 1 Tablet by mouth three (3) times daily as needed for Muscle Spasm(s). , Normal, Disp-30 Tablet, R-1  -     XR HIP RT W OR WO PELV 2-3 VWS; Future  3. Abscess of buttock, right  4. History of MRSA infection  5. Medication management  -     METABOLIC PANEL, COMPREHENSIVE; Future  -     CBC WITH AUTOMATED DIFF; Future  -     VITAMIN B12 & FOLATE; Future           Follow-up and Dispositions    · Return Neuropathic pain doesn't  improve w/ increase in Trileptal. MSK pain doesn't improve w/ Flexeril. Disclaimer:  Advised patient to call back or return to office if symptoms worsen/change/persist.  Discussed expected course/resolution/complications of diagnosis in detail with patient. Medication risks/benefits/alternatives discussed with patient. Patient was given an after visit summary which includes diagnoses, current medications, & vitals. Discussed patient instructions and advised to read to all patient instructions regarding care. Patient expressed understanding with the diagnosis and plan.        Klever Dias NP  4/26/2022

## 2022-04-26 NOTE — PROGRESS NOTES
Chief Complaint   Patient presents with   [address] Maintenance Due   Topic    Hepatitis C Screening     DTaP/Tdap/Td series (2 - Td or Tdap)    Depression Screen         1. Have you been to the ER, urgent care clinic since your last visit? Hospitalized since your last visit? 04/15/22[facility]- abscess-     2. Have you seen or consulted any other health care providers outside of the [address] [place] [address] since your last visit? Include any pap smears or colon screening.  No    Visit Vitals  /83 (BP 1 Location: Left upper arm, BP Patient Position: Sitting, BP Cuff Size: Adult)   Pulse 74   Temp 97.1 °F (36.2 °C) (Temporal)   Resp 16   Ht 5' 6\" (1.676 m)   Wt 160 lb (72.6 kg)   SpO2 96%   BMI 25.82 kg/m²

## 2022-05-03 ENCOUNTER — TELEPHONE (OUTPATIENT)
Dept: PRIMARY CARE CLINIC | Age: 47
End: 2022-05-03

## 2022-05-03 LAB
ALBUMIN SERPL-MCNC: 4.3 G/DL (ref 4–5)
ALBUMIN/GLOB SERPL: 1.8 {RATIO} (ref 1.2–2.2)
ALP SERPL-CCNC: 169 IU/L (ref 44–121)
ALT SERPL-CCNC: 22 IU/L (ref 0–44)
AST SERPL-CCNC: 16 IU/L (ref 0–40)
BASOPHILS # BLD AUTO: 0.1 X10E3/UL (ref 0–0.2)
BASOPHILS NFR BLD AUTO: 1 %
BILIRUB SERPL-MCNC: <0.2 MG/DL (ref 0–1.2)
BUN SERPL-MCNC: 20 MG/DL (ref 6–24)
BUN/CREAT SERPL: 17 (ref 9–20)
CALCIUM SERPL-MCNC: 9 MG/DL (ref 8.7–10.2)
CHLORIDE SERPL-SCNC: 100 MMOL/L (ref 96–106)
CO2 SERPL-SCNC: 19 MMOL/L (ref 20–29)
CREAT SERPL-MCNC: 1.19 MG/DL (ref 0.76–1.27)
EGFR: 76 ML/MIN/1.73
EOSINOPHIL # BLD AUTO: 0.2 X10E3/UL (ref 0–0.4)
EOSINOPHIL NFR BLD AUTO: 4 %
ERYTHROCYTE [DISTWIDTH] IN BLOOD BY AUTOMATED COUNT: 12.2 % (ref 11.6–15.4)
FOLATE SERPL-MCNC: 5.5 NG/ML
GLOBULIN SER CALC-MCNC: 2.4 G/DL (ref 1.5–4.5)
GLUCOSE SERPL-MCNC: 80 MG/DL (ref 65–99)
HCT VFR BLD AUTO: 44.9 % (ref 37.5–51)
HGB BLD-MCNC: 15 G/DL (ref 13–17.7)
IMM GRANULOCYTES # BLD AUTO: 0 X10E3/UL (ref 0–0.1)
IMM GRANULOCYTES NFR BLD AUTO: 1 %
LYMPHOCYTES # BLD AUTO: 1.6 X10E3/UL (ref 0.7–3.1)
LYMPHOCYTES NFR BLD AUTO: 25 %
MCH RBC QN AUTO: 32 PG (ref 26.6–33)
MCHC RBC AUTO-ENTMCNC: 33.4 G/DL (ref 31.5–35.7)
MCV RBC AUTO: 96 FL (ref 79–97)
MONOCYTES # BLD AUTO: 0.7 X10E3/UL (ref 0.1–0.9)
MONOCYTES NFR BLD AUTO: 11 %
NEUTROPHILS # BLD AUTO: 3.7 X10E3/UL (ref 1.4–7)
NEUTROPHILS NFR BLD AUTO: 58 %
PLATELET # BLD AUTO: 289 X10E3/UL (ref 150–450)
POTASSIUM SERPL-SCNC: 4.9 MMOL/L (ref 3.5–5.2)
PROT SERPL-MCNC: 6.7 G/DL (ref 6–8.5)
RBC # BLD AUTO: 4.69 X10E6/UL (ref 4.14–5.8)
SODIUM SERPL-SCNC: 137 MMOL/L (ref 134–144)
VIT B12 SERPL-MCNC: 310 PG/ML (ref 232–1245)
WBC # BLD AUTO: 6.3 X10E3/UL (ref 3.4–10.8)

## 2022-05-03 NOTE — TELEPHONE ENCOUNTER
----- Message from Faustino Llamas NP sent at 5/3/2022  2:46 PM EDT -----  Alk. Phos. Continues to be elevated? Do you have any signs and symptoms? If not, will continue to monitor. All other labs are unremarkable.

## 2022-05-03 NOTE — PROGRESS NOTES
Alk. Phos. Continues to be elevated? Do you have any signs and symptoms? If not, will continue to monitor. All other labs are unremarkable.

## 2022-05-04 ENCOUNTER — TELEPHONE (OUTPATIENT)
Dept: PRIMARY CARE CLINIC | Age: 47
End: 2022-05-04

## 2022-05-26 ENCOUNTER — HOSPITAL ENCOUNTER (EMERGENCY)
Age: 47
Discharge: HOME | End: 2022-05-26
Attending: STUDENT IN AN ORGANIZED HEALTH CARE EDUCATION/TRAINING PROGRAM
Payer: COMMERCIAL

## 2022-05-26 VITALS
TEMPERATURE: 98.9 F | HEART RATE: 98 BPM | SYSTOLIC BLOOD PRESSURE: 160 MMHG | RESPIRATION RATE: 16 BRPM | OXYGEN SATURATION: 98 % | DIASTOLIC BLOOD PRESSURE: 90 MMHG

## 2022-05-26 DIAGNOSIS — L03.317 CELLULITIS OF BUTTOCK: Primary | ICD-10-CM

## 2022-05-26 DIAGNOSIS — Z86.14 HX MRSA INFECTION: ICD-10-CM

## 2022-05-26 PROCEDURE — 74011250637 HC RX REV CODE- 250/637: Performed by: PHYSICIAN ASSISTANT

## 2022-05-26 PROCEDURE — 99283 EMERGENCY DEPT VISIT LOW MDM: CPT

## 2022-05-26 RX ORDER — IBUPROFEN 400 MG/1
800 TABLET ORAL
Status: COMPLETED | OUTPATIENT
Start: 2022-05-26 | End: 2022-05-26

## 2022-05-26 RX ORDER — DOXYCYCLINE HYCLATE 100 MG
100 TABLET ORAL
Status: COMPLETED | OUTPATIENT
Start: 2022-05-26 | End: 2022-05-26

## 2022-05-26 RX ORDER — DOXYCYCLINE HYCLATE 100 MG
100 TABLET ORAL 2 TIMES DAILY
Qty: 20 TABLET | Refills: 0 | Status: SHIPPED | OUTPATIENT
Start: 2022-05-26 | End: 2022-05-26

## 2022-05-26 RX ORDER — ACETAMINOPHEN 325 MG/1
650 TABLET ORAL
Status: COMPLETED | OUTPATIENT
Start: 2022-05-26 | End: 2022-05-26

## 2022-05-26 RX ADMIN — IBUPROFEN 800 MG: 400 TABLET, FILM COATED ORAL at 21:08

## 2022-05-26 RX ADMIN — ACETAMINOPHEN 650 MG: 325 TABLET ORAL at 21:08

## 2022-05-26 RX ADMIN — DOXYCYCLINE HYCLATE 100 MG: 100 TABLET, COATED ORAL at 21:09

## 2022-05-26 NOTE — ED TRIAGE NOTES
Three days of pain left buttock. He says there is an abscess there. He says he has had the same thing before.  He is not a diabetic

## 2022-05-27 RX ORDER — DOXYCYCLINE HYCLATE 100 MG
100 TABLET ORAL 2 TIMES DAILY
Qty: 14 TABLET | Refills: 0 | Status: SHIPPED | OUTPATIENT
Start: 2022-05-27 | End: 2022-06-03

## 2022-05-27 NOTE — DISCHARGE INSTRUCTIONS
Please treat with Tylenol and ibuprofen (Motrin) at home. You can alternate these every 3 hours: Tylenol - 3 hours - ibuprofen (Motrin) - 3 hours - Tylenol - etc. such that there are 6 hours between each dose of Tylenol and 6 hours between each dose of ibuprofen (Motrin). Max dose of Acetaminophen (Tylenol):  4 g per day from all sources. Please note most acetaminophen is dosed in mg. As such, 4000 mg per day is the maximum dosing per day. Please also note that some cold medications contain acetaminophen. Please do not take this high dosing for long periods of time as this can affect your liver. Max dose of ibuprofen (Advil, Aleve):  2400 mg per day from all sources. You can take either 600 mg four times daily or 800 mg three times daily. Please do not exceed this as it can be hard on your kidneys. Please take with a small amount of food if it causes stomach upset. Please do not take this high dosing for long periods of time as this can cause adverse effects, such as ulcers and GI bleeding. Please perform warm soaks to the region with either compresses or Epsom salt baths as we discussed.

## 2022-05-27 NOTE — ED PROVIDER NOTES
Dale Polo is a 51 y.o. male with PMhx of MRSA infections, who presents to ED with cc of 6/10 L buttock pain x Tuesday. He noted possible onset of pain on Monday, but notes definitive onset of pain on Tuesday. Notes hx of similar where he was dx'd with abscess. Had been given previous information for general surgery in the past but didn't set up an appointment with them. States he took a dose of Advil and a dose of Tylenol for his symptoms. Denies any fevers but states he had malaise yesterday. Denies hx of DM. Pt denies additional symptoms of F/C, cough, congestion, CP, SOB, dysuria, urinary frequency, constipation, diarrhea, abdominal pain, nausea, vomiting. PMHx: Reviewed, as below. PSHx: Reviewed, as below. PCP: Manuel Weber, NP    There are no other complaints verbalized at this time. Past Medical History:   Diagnosis Date    MRSA infection     Other ill-defined conditions(431.95)     neurological pain s/p back surgery       Past Surgical History:   Procedure Laterality Date    HX ORTHOPAEDIC      tumor removed from thoracic spine. H/O radiation treatment .  1985    HX OTHER SURGICAL  8/11/13    I&D of buttocks abscess x3 by Dr Bibi Mar      Inguinal hernia repair         Family History:   Problem Relation Age of Onset    Stroke Father     Hypertension Father     Cancer Maternal Grandfather     Stroke Paternal Grandmother        Social History     Socioeconomic History    Marital status: SINGLE     Spouse name: Not on file    Number of children: Not on file    Years of education: Not on file    Highest education level: Not on file   Occupational History    Not on file   Tobacco Use    Smoking status: Never Smoker    Smokeless tobacco: Never Used   Vaping Use    Vaping Use: Never used   Substance and Sexual Activity    Alcohol use: Yes     Comment: socially    Drug use: No    Sexual activity: Not Currently   Other Topics Concern    Not on file   Social History Narrative    Not on file     Social Determinants of Health     Financial Resource Strain:     Difficulty of Paying Living Expenses: Not on file   Food Insecurity:     Worried About Running Out of Food in the Last Year: Not on file    Tatum of Food in the Last Year: Not on file   Transportation Needs:     Lack of Transportation (Medical): Not on file    Lack of Transportation (Non-Medical): Not on file   Physical Activity: Insufficiently Active    Days of Exercise per Week: 2 days    Minutes of Exercise per Session: 30 min   Stress:     Feeling of Stress : Not on file   Social Connections:     Frequency of Communication with Friends and Family: Not on file    Frequency of Social Gatherings with Friends and Family: Not on file    Attends Sikh Services: Not on file    Active Member of Clubs or Organizations: Not on file    Attends Club or Organization Meetings: Not on file    Marital Status: Not on file   Intimate Partner Violence: Not At Risk    Fear of Current or Ex-Partner: No    Emotionally Abused: No    Physically Abused: No    Sexually Abused: No   Housing Stability:     Unable to Pay for Housing in the Last Year: Not on file    Number of [place] in the Last Year: Not on file    Unstable Housing in the Last Year: Not on file         ALLERGIES: Clindamycin    Review of Systems   Constitutional: Negative for chills and fever. HENT: Negative for congestion. Respiratory: Negative for cough and shortness of breath. Cardiovascular: Negative for chest pain. Gastrointestinal: Negative for abdominal pain, constipation, diarrhea, nausea and vomiting. Genitourinary: Negative for dysuria and frequency. Skin: Positive for color change. All other systems reviewed and are negative.       Vitals:    05/26/22 1927   BP: (!) 161/99   Pulse: (!) 103   Resp: 16   Temp: 98.8 °F (37.1 °C)   SpO2: 97%            Physical Exam  Vitals and nursing note reviewed. Constitutional:       Appearance: He is not diaphoretic. HENT:      Head: Normocephalic. Right Ear: External ear normal.      Left Ear: External ear normal.   Eyes:      General: No scleral icterus. Cardiovascular:      Rate and Rhythm: Normal rate and regular rhythm. Heart sounds: Normal heart sounds. No murmur heard. Pulmonary:      Effort: Pulmonary effort is normal. No respiratory distress. Breath sounds: Normal breath sounds. No stridor. No wheezing, rhonchi or rales. Abdominal:      General: Bowel sounds are normal. There is no distension. Palpations: Abdomen is soft. There is no mass. Tenderness: There is no abdominal tenderness. There is no guarding or rebound. Hernia: No hernia is present. Genitourinary:     Comments: Matty Monson RN as chaperone. Erythema noted along L medial buttock. Region of induration by L medial crease without palpable fluctuance. Does not extend to rectum. Musculoskeletal:         General: No swelling. Cervical back: Normal range of motion. Skin:     General: Skin is warm and dry. Neurological:      Mental Status: He is alert and oriented to person, place, and time. Mental status is at baseline. MDM  Number of Diagnoses or Management Options     Amount and/or Complexity of Data Reviewed  Discuss the patient with other providers: yes (Dr Kelsey Delvalle, ED attending)           Procedures          VITAL SIGNS:  Vitals:    05/26/22 1927 05/26/22 2111   BP: (!) 161/99 (!) 160/90   Pulse: (!) 103 98   Resp: 16 16   Temp: 98.8 °F (37.1 °C) 98.9 °F (37.2 °C)   SpO2: 97% 98%         LABS:  No results found for this or any previous visit (from the past 24 hour(s)).       IMAGING:  No orders to display         Medications During Visit:  Medications   acetaminophen (TYLENOL) tablet 650 mg (650 mg Oral Given 5/26/22 2108)   ibuprofen (MOTRIN) tablet 800 mg (800 mg Oral Given 5/26/22 2108)   doxycycline (VIBRA-TABS) tablet 100 mg (100 mg Oral Given 5/26/22 2109)         DECISION MAKING:    Ben Helms is a 51 y.o. male who comes in as above. Presents afebrile and hemodynamically stable. History of MRSA infections and history of abscesses to left and right buttock. Presents with evidence of induration without central fluctuance. Bedside ultrasound performed with ED attending and I, without evidence of significant fluid collection amenable to I&D. No apparent rectal involvement. I have discussed this result with patient. He has been referred to general surgery in the past and given history of recurrent abscesses to these regions. Given history of MRSA infection, will discharge on course of doxycycline. I have discussed care with ED attending. Pt has been given close return precautions as well as follow up recommendations. Opportunity for questions presented. Pt verbalizes their understanding and agreement with care plan. IMPRESSION:  1. Cellulitis of buttock    2. Hx MRSA infection        DISPOSITION:  Discharged      Discharge Medication List as of 5/26/2022  8:59 PM      CONTINUE these medications which have NOT CHANGED    Details   OXcarbazepine (TRILEPTAL) 300 mg tablet Take 1.5 Tablets by mouth daily. , Normal, Disp-45 Tablet, R-2Need appointment. cyclobenzaprine (FLEXERIL) 5 mg tablet Take 1 Tablet by mouth three (3) times daily as needed for Muscle Spasm(s). , Normal, Disp-30 Tablet, R-1              Follow-up Information     Follow up With Specialties Details Why Contact Uday Suggs NP Nurse Practitioner Schedule an appointment as soon as possible for a visit   [address] [place] [address]       Dermatology [address] [place] [address]  Schedule an appointment as soon as possible for a visit   [facility] [address] Surgery Schedule an appointment as soon as possible for a visit   [address] [place] [address] [facility]  [phone]            The patient is asked to follow-up with their primary care provider and any other physicians as above. We have discussed strict return precautions and the patient is asked to return promptly for any increased concerns or worsening of symptoms and for return precautions regarding their symptoms today. They can return to this emergency department or any other emergency department. I have discussed with them results as above and presented opportunity for questions. They verbalize their understanding of the above and agreement with care plan. Please note that this dictation was completed with [employer], the [employer] voice recognition software. Quite often unanticipated grammatical, syntax, homophones, and other interpretive errors are inadvertently transcribed by the computer software. Please disregard these errors. Please excuse any errors that have escaped final proofreading.

## 2022-05-31 ENCOUNTER — TELEPHONE (OUTPATIENT)
Dept: PRIMARY CARE CLINIC | Age: 47
End: 2022-05-31

## 2022-05-31 NOTE — TELEPHONE ENCOUNTER
----- Message from Benny Ta NP sent at 5/27/2022  7:53 AM EDT -----  Please have this patient see me.

## 2022-06-10 ENCOUNTER — OFFICE VISIT (OUTPATIENT)
Dept: PRIMARY CARE CLINIC | Age: 47
End: 2022-06-10
Payer: COMMERCIAL

## 2022-06-10 VITALS
SYSTOLIC BLOOD PRESSURE: 138 MMHG | DIASTOLIC BLOOD PRESSURE: 87 MMHG | OXYGEN SATURATION: 98 % | HEIGHT: 66 IN | RESPIRATION RATE: 16 BRPM | WEIGHT: 163 LBS | HEART RATE: 58 BPM | TEMPERATURE: 97.1 F | BODY MASS INDEX: 26.2 KG/M2

## 2022-06-10 DIAGNOSIS — L03.317 CELLULITIS OF BUTTOCK, LEFT: Primary | ICD-10-CM

## 2022-06-10 DIAGNOSIS — M79.2 CHRONIC NEUROPATHIC PAIN: ICD-10-CM

## 2022-06-10 DIAGNOSIS — R74.8 ELEVATED ALKALINE PHOSPHATASE LEVEL: ICD-10-CM

## 2022-06-10 DIAGNOSIS — Z86.14 HX MRSA INFECTION: ICD-10-CM

## 2022-06-10 DIAGNOSIS — M79.651 MUSCULOSKELETAL PAIN OF RIGHT THIGH: ICD-10-CM

## 2022-06-10 DIAGNOSIS — Z87.2 HISTORY OF ABSCESS OF SKIN AND SUBCUTANEOUS TISSUE: ICD-10-CM

## 2022-06-10 DIAGNOSIS — G89.29 CHRONIC NEUROPATHIC PAIN: ICD-10-CM

## 2022-06-10 PROCEDURE — 99214 OFFICE O/P EST MOD 30 MIN: CPT | Performed by: NURSE PRACTITIONER

## 2022-06-10 NOTE — PROGRESS NOTES
[facility]   [address] [facility], [address] [place] [address]  P: [phone]  F: [phone]    SUBJECTIVE     HPI:     Marcellus Booth is a 51 y.o. male who is seen in the clinic for   Chief Complaint   Patient presents with    Follow-up      The patient was admitted into the ED on 05/26 for Left Buttock Cellulitis. An ultrasound was preformed to rule out possible abscess formation. He was prescribed Doxycyline and told to f/u with Dermatology and General Surgery as soon as possible. He has not made an appointment to both specialties yet and would like new referral information. He has had multiple episodes of cellulitis and abscess formations around his bilateral buttocks in the past.     Today, he states that his erythema/edema on his left buttocks has resolved with Doxycyline and he denies any pain. On 04/26, the patient established care with me. During that visit, his Trileptal was increased to 450 mg QHS for his Chronic Neuropathic Pain and prn Flexeril was prescribed for his acute intermittent right lateral thigh pain. The Trileptal has \"helped a lot\" and prn Flexeril has been used a few times with effectiveness in treating his pain. His Alk. Phos. Was 169 in April. He denies any s/s. Has been chronically elevated. Patient Active Problem List    Diagnosis    Astrocytoma of spinal cord ([address]    Neuropathic pain of foot    Stricture esophagus    Abscess of buttock        Past Medical History:   Diagnosis Date    MRSA infection     Other ill-defined conditions(509.89)     neurological pain s/p back surgery     Past Surgical History:   Procedure Laterality Date    HX ORTHOPAEDIC      tumor removed from thoracic spine. H/O radiation treatment .  1985    HX OTHER SURGICAL  8/11/13    I&D of buttocks abscess x3 by Dr Felix Brar      Inguinal hernia repair     Social History     Socioeconomic History    Marital status: SINGLE     Spouse name: Not on file    Number of children: Not on file    Years of education: Not on file    Highest education level: Not on file   Occupational History    Not on file   Tobacco Use    Smoking status: Never Smoker    Smokeless tobacco: Never Used   Vaping Use    Vaping Use: Never used   Substance and Sexual Activity    Alcohol use: Yes     Comment: socially    Drug use: No    Sexual activity: Not Currently   Other Topics Concern    Not on file   Social History Narrative    Not on file     Social Determinants of Health     Financial Resource Strain:     Difficulty of Paying Living Expenses: Not on file   Food Insecurity:     Worried About Running Out of Food in the Last Year: Not on file    Tatum of Food in the Last Year: Not on file   Transportation Needs:     Lack of Transportation (Medical): Not on file    Lack of Transportation (Non-Medical):  Not on file   Physical Activity: Insufficiently Active    Days of Exercise per Week: 2 days    Minutes of Exercise per Session: 30 min   Stress:     Feeling of Stress : Not on file   Social Connections:     Frequency of Communication with Friends and Family: Not on file    Frequency of Social Gatherings with Friends and Family: Not on file    Attends Hoahaoism Services: Not on file    Active Member of Clubs or Organizations: Not on file    Attends Club or Organization Meetings: Not on file    Marital Status: Not on file   Intimate Partner Violence: Not At Risk    Fear of Current or Ex-Partner: No    Emotionally Abused: No    Physically Abused: No    Sexually Abused: No   Housing Stability:     Unable to Pay for Housing in the Last Year: Not on file    Number of [place] in the Last Year: Not on file    Unstable Housing in the Last Year: Not on file     Family History   Problem Relation Age of Onset    Stroke Father     Hypertension Father     Cancer Maternal Grandfather     Stroke Paternal Grandmother      Immunization History Administered Date(s) Administered    COVID-19, J&J, PF, 0.5 mL Dose 04/12/2021    COVID-19, Pfizer Purple top, DILUTE for use, 12+ yrs, 30mcg/0.3mL dose 01/02/2022    Tdap 02/28/2007      Allergies   Allergen Reactions    Clindamycin Rash       Office Visit on 04/26/2022   Component Date Value Ref Range Status    Glucose 05/02/2022 80  65 - 99 mg/dL Final    BUN 05/02/2022 20  6 - 24 mg/dL Final    Creatinine 05/02/2022 1.19  0.76 - 1.27 mg/dL Final    eGFR 05/02/2022 76  >59 mL/min/1.73 Final    BUN/Creatinine ratio 05/02/2022 17  9 - 20 Final    Sodium 05/02/2022 137  134 - 144 mmol/L Final    Potassium 05/02/2022 4.9  3.5 - 5.2 mmol/L Final    Chloride 05/02/2022 100  96 - 106 mmol/L Final    CO2 05/02/2022 19* 20 - 29 mmol/L Final    Calcium 05/02/2022 9.0  8.7 - 10.2 mg/dL Final    Protein, total 05/02/2022 6.7  6.0 - 8.5 g/dL Final    Albumin 05/02/2022 4.3  4.0 - 5.0 g/dL Final    GLOBULIN, TOTAL 05/02/2022 2.4  1.5 - 4.5 g/dL Final    A-G Ratio 05/02/2022 1.8  1.2 - 2.2 Final    Bilirubin, total 05/02/2022 <0.2  0.0 - 1.2 mg/dL Final    Alk. phosphatase 05/02/2022 169* 44 - 121 IU/L Final    AST (SGOT) 05/02/2022 16  0 - 40 IU/L Final    ALT (SGPT) 05/02/2022 22  0 - 44 IU/L Final    WBC 05/02/2022 6.3  3.4 - 10.8 x10E3/uL Final    RBC 05/02/2022 4.69  4.14 - 5.80 x10E6/uL Final    HGB 05/02/2022 15.0  13.0 - 17.7 g/dL Final    HCT 05/02/2022 44.9  37.5 - 51.0 % Final    MCV 05/02/2022 96  79 - 97 fL Final    MCH 05/02/2022 32.0  26.6 - 33.0 pg Final    MCHC 05/02/2022 33.4  31.5 - 35.7 g/dL Final    RDW 05/02/2022 12.2  11.6 - 15.4 % Final    PLATELET 79/61/1787 791  150 - 450 x10E3/uL Final    NEUTROPHILS 05/02/2022 58  Not Estab. % Final    Lymphocytes 05/02/2022 25  Not Estab. % Final    MONOCYTES 05/02/2022 11  Not Estab. % Final    EOSINOPHILS 05/02/2022 4  Not Estab. % Final    BASOPHILS 05/02/2022 1  Not Estab. % Final    ABS.  NEUTROPHILS 05/02/2022 3.7  1.4 - 7.0 x10E3/uL Final    Abs Lymphocytes 05/02/2022 1.6  0.7 - 3.1 x10E3/uL Final    ABS. MONOCYTES 05/02/2022 0.7  0.1 - 0.9 x10E3/uL Final    ABS. EOSINOPHILS 05/02/2022 0.2  0.0 - 0.4 x10E3/uL Final    ABS. BASOPHILS 05/02/2022 0.1  0.0 - 0.2 x10E3/uL Final    IMMATURE GRANULOCYTES 05/02/2022 1  Not Estab. % Final    ABS. IMM. GRANS. 05/02/2022 0.0  0.0 - 0.1 x10E3/uL Final    Vitamin B12 05/02/2022 310  232 - 1,245 pg/mL Final    Folate 05/02/2022 5.5  >3.0 ng/mL Final    Comment: A serum folate concentration of less than 3.1 ng/mL is  considered to represent clinical deficiency. MRI CERV SPINE WO CONT  Narrative: ADDITIONAL INDICATION:  Neck and back pain, previous thoracic surgery for tumor,  followed by radiation treatment in 1985. EXAM: MRI CERVICAL SPINE WITHOUT CONTRAST. Sagittal T1-weighted images, sagittal  turbo spin-echo T2-weighted images, sagittal STIR images, axial T1-weighted  images, axial T2 star weighted images. COMPARISON:  None  . FINDINGS:   Bony alignment is normal  . Bone signal intensity is within normal limits for  age . Vertebral body heights and disc space heights  are preserved. .  The  cervical spinal cord is intrinsically normal in signal intensity and anatomy. The craniocervical junction and prevertebral soft tissue space are normal.    C2-3: Mild relative left foraminal narrowing. C3-4: Mild left foraminal stenosis. C4-5: Mild to moderate left foraminal stenosis. Uncal hypertrophy. C5-6: Broad spondylosis and bulge, Central focal protrusion, mild central  stenosis with cord effacement. . Moderately severe bilateral foraminal stenosis  with uncal hypertrophy. C6-7: No herniation or stenosis. Mild bilateral foraminal stenosis. C7-T1: No herniation or central stenosis. Impression: 1. C6-7: Mild bilateral foraminal stenosis. 2. C5-6: Broad bulge, central focal protrusion, mild central stenosis with cord  effacement.  Moderate bilateral foraminal stenosis. 3. C4-5: Mild to moderate left foraminal stenosis. 4. C3-4: Mild left foraminal stenosis. 5. C2-3: Mild relative left foraminal narrowing. .. Current Outpatient Medications   Medication Sig Dispense Refill    OXcarbazepine (TRILEPTAL) 300 mg tablet Take 1.5 Tablets by mouth daily. 45 Tablet 2    cyclobenzaprine (FLEXERIL) 5 mg tablet Take 1 Tablet by mouth three (3) times daily as needed for Muscle Spasm(s). 30 Tablet 1           The past medical history, past surgical history, and family history were reviewed and updated in the medical record. Lab values/Imaging were reviewed. The medications were reviewed and updated in the medical record. Immunizations were reviewed and updated in the medical record. All relevant preventative screenings reviewed and updated in the medical record. REVIEW OF SYSTEMS   Review of Systems   Constitutional: Negative for chills, diaphoresis, fever and malaise/fatigue. Respiratory: Negative for cough, shortness of breath and wheezing. Cardiovascular: Negative for chest pain and palpitations. Musculoskeletal: Negative for back pain, joint pain and myalgias. Skin: Negative for itching. PHYSICAL EXAM   /87 (BP 1 Location: Left upper arm, BP Patient Position: Sitting, BP Cuff Size: Adult)   Pulse (!) 58   Temp 97.1 °F (36.2 °C) (Temporal)   Resp 16   Ht 5' 6\" (1.676 m)   Wt 163 lb (73.9 kg)   SpO2 98%   BMI 26.31 kg/m²      Physical Exam  Constitutional:       General: He is not in acute distress. Appearance: Normal appearance. He is not ill-appearing or diaphoretic. Cardiovascular:      Rate and Rhythm: Normal rate and regular rhythm. Pulses: Normal pulses. Heart sounds: Normal heart sounds. No murmur heard. Pulmonary:      Effort: Pulmonary effort is normal. No respiratory distress. Breath sounds: Normal breath sounds. Musculoskeletal:         General: No deformity.    Skin:     Findings: No erythema or rash.   Neurological:      Mental Status: He is alert. ASSESSMENT/ PLAN   Below is the assessment and plan developed based on review of pertinent history, physical exam, labs, studies, and medications. 1. Cellulitis of buttock, left  -     REFERRAL TO DERMATOLOGY  2. Chronic neuropathic pain  Continue with scheduled Trileptal.   3. Musculoskeletal pain of right thigh  Continue with prn Flexeril. 4. Hx MRSA infection  -     REFERRAL TO DERMATOLOGY  -     REFERRAL TO GENERAL SURGERY  5. History of abscess of skin and subcutaneous tissue  -     REFERRAL TO GENERAL SURGERY  6. Elevated alkaline phosphatase level   Currently asymptomatic. Will continue to monitor. Discussed with the patient that d/t the fact that he has had several episodes of cellulitis/abscess formation around his buttocks, it may be best to establish care with Dermatology to assess the root cause behind this and possibly place him on long-term abx. He is agreeable to this plan. Follow-up and Dispositions    · Return in about 3 months (around 9/10/2022) for Management of co-morbidities . Disclaimer:  Advised patient to call back or return to office if symptoms worsen/change/persist.  Discussed expected course/resolution/complications of diagnosis in detail with patient. Medication risks/benefits/alternatives discussed with patient. Patient was given an after visit summary which includes diagnoses, current medications, & vitals. Discussed patient instructions and advised to read to all patient instructions regarding care. Patient expressed understanding with the diagnosis and plan.        Bryan Rivera NP  6/10/2022

## 2022-06-10 NOTE — PROGRESS NOTES
Chief Complaint   Patient presents with    Follow-up       Health Maintenance Due   Topic    Hepatitis C Screening     DTaP/Tdap/Td series (2 - Td or Tdap)        1. Have you been to the ER, urgent care clinic since your last visit? Hospitalized since your last visit? Yes 05/26/22- [facility]- cellulitis     2. Have you seen or consulted any other health care providers outside of the [address] [place] [address] since your last visit? Include any pap smears or colon screening.  No    Visit Vitals  BP (!) 156/90 (BP 1 Location: Left upper arm, BP Patient Position: Sitting, BP Cuff Size: Adult)   Pulse (!) 54   Temp 97.1 °F (36.2 °C) (Temporal)   Resp 16   Ht 5' 6\" (1.676 m)   Wt 163 lb (73.9 kg)   SpO2 98%   BMI 26.31 kg/m²

## 2022-06-23 ENCOUNTER — TELEPHONE (OUTPATIENT)
Dept: SURGERY | Age: 47
End: 2022-06-23

## 2022-06-23 NOTE — TELEPHONE ENCOUNTER
Attempted to contact pt regarding the referral we received from NP [facility] about a skin abscess issue that was referred to Dr. Wendy Kidd. Left vm for t to call office back. If pt calls back please schedule him an NP appt with Wendy Kidd.

## 2022-11-16 ENCOUNTER — HOSPITAL ENCOUNTER (EMERGENCY)
Age: 47
Discharge: HOME | End: 2022-11-16
Attending: EMERGENCY MEDICINE
Payer: COMMERCIAL

## 2022-11-16 VITALS
SYSTOLIC BLOOD PRESSURE: 135 MMHG | TEMPERATURE: 97.9 F | HEART RATE: 98 BPM | RESPIRATION RATE: 18 BRPM | OXYGEN SATURATION: 98 % | BODY MASS INDEX: 25.44 KG/M2 | WEIGHT: 158.29 LBS | HEIGHT: 66 IN | DIASTOLIC BLOOD PRESSURE: 78 MMHG

## 2022-11-16 DIAGNOSIS — L02.91 ABSCESS: Primary | ICD-10-CM

## 2022-11-16 PROCEDURE — 75810000289 HC I&D ABSCESS SIMP/COMP/MULT

## 2022-11-16 PROCEDURE — 99283 EMERGENCY DEPT VISIT LOW MDM: CPT

## 2022-11-16 PROCEDURE — 74011000250 HC RX REV CODE- 250: Performed by: EMERGENCY MEDICINE

## 2022-11-16 RX ORDER — LIDOCAINE HYDROCHLORIDE 10 MG/ML
5 INJECTION, SOLUTION EPIDURAL; INFILTRATION; INTRACAUDAL; PERINEURAL ONCE
Status: COMPLETED | OUTPATIENT
Start: 2022-11-16 | End: 2022-11-16

## 2022-11-16 RX ORDER — DOXYCYCLINE HYCLATE 100 MG
100 TABLET ORAL 2 TIMES DAILY
Qty: 14 TABLET | Refills: 0 | Status: SHIPPED | OUTPATIENT
Start: 2022-11-16 | End: 2022-11-23

## 2022-11-16 RX ADMIN — LIDOCAINE HYDROCHLORIDE 5 ML: 10 INJECTION, SOLUTION EPIDURAL; INFILTRATION; INTRACAUDAL; PERINEURAL at 17:02

## 2022-11-16 NOTE — ED TRIAGE NOTES
Pt presents ambulatory. States he has an abscess on his right buttocks that he first noticed on Sunday. States he has experienced chills, malaise, and drainage from the abscess (clear and blood tinged).

## 2022-11-16 NOTE — ED PROVIDER NOTES
The history is provided by the patient. Skin Problem   This is a recurrent problem. The current episode started more than 2 days ago. The problem has been gradually worsening. The problem is associated with nothing. There has been no fever. The rash is present on the genitalia. The pain is moderate. The pain has been Constant since onset. Associated symptoms include pain and weeping. Pertinent negatives include no blisters and no itching. He has tried nothing for the symptoms. The treatment provided no relief. Past Medical History:   Diagnosis Date    MRSA infection     Other ill-defined conditions(819.89)     neurological pain s/p back surgery       Past Surgical History:   Procedure Laterality Date    HX ORTHOPAEDIC      tumor removed from thoracic spine. H/O radiation treatment .  1985    HX OTHER SURGICAL  8/11/13    I&D of buttocks abscess x3 by Dr Tiera Quintanilla      Inguinal hernia repair         Family History:   Problem Relation Age of Onset    Stroke Father     Hypertension Father     Cancer Maternal Grandfather     Stroke Paternal Grandmother        Social History     Socioeconomic History    Marital status: SINGLE     Spouse name: Not on file    Number of children: Not on file    Years of education: Not on file    Highest education level: Not on file   Occupational History    Not on file   Tobacco Use    Smoking status: Never    Smokeless tobacco: Never   Vaping Use    Vaping Use: Never used   Substance and Sexual Activity    Alcohol use: Yes     Comment: socially    Drug use: No    Sexual activity: Not Currently   Other Topics Concern    Not on file   Social History Narrative    Not on file     Social Determinants of Health     Financial Resource Strain: Not on file   Food Insecurity: Not on file   Transportation Needs: Not on file   Physical Activity: Insufficiently Active    Days of Exercise per Week: 2 days    Minutes of Exercise per Session: 30 min   Stress: Not on file   Social Connections: Not on file   Intimate Partner Violence: Not At Risk    Fear of Current or Ex-Partner: No    Emotionally Abused: No    Physically Abused: No    Sexually Abused: No   Housing Stability: Not on file         ALLERGIES: Clindamycin    Review of Systems   Constitutional:  Negative for activity change, chills and fever. HENT:  Negative for nosebleeds, sore throat, trouble swallowing and voice change. Eyes:  Negative for visual disturbance. Respiratory:  Negative for shortness of breath. Cardiovascular:  Negative for chest pain and palpitations. Gastrointestinal:  Negative for abdominal pain, constipation, diarrhea and nausea. Genitourinary:  Negative for difficulty urinating, dysuria, hematuria and urgency. Musculoskeletal:  Negative for back pain, neck pain and neck stiffness. Skin:  Positive for wound. Negative for color change and itching. Allergic/Immunologic: Negative for immunocompromised state. Neurological:  Negative for dizziness, seizures, syncope, weakness, light-headedness, numbness and headaches. Psychiatric/Behavioral:  Negative for behavioral problems, confusion, hallucinations, self-injury and suicidal ideas. Vitals:    11/16/22 1635   BP: (!) 155/89   Pulse: 98   Resp: 18   Temp: 97.9 °F (36.6 °C)   SpO2: 98%   Weight: 71.8 kg (158 lb 4.6 oz)   Height: 5' 6\" (1.676 m)            Physical Exam  Vitals and nursing note reviewed. Constitutional:       General: He is not in acute distress. Appearance: He is well-developed. He is not diaphoretic. HENT:      Head: Atraumatic. Neck:      Trachea: No tracheal deviation. Cardiovascular:      Comments: Warm and well perfused  Pulmonary:      Effort: Pulmonary effort is normal. No respiratory distress. Musculoskeletal:         General: Normal range of motion. Skin:     General: Skin is warm and dry. Findings: Abscess present. Neurological:      Mental Status: He is alert. Coordination: Coordination normal.   Psychiatric:         Behavior: Behavior normal.         Thought Content: Thought content normal.         Judgment: Judgment normal.        MDM    This is a 50-year-old male with past medical history, review of systems, physical exam as above, presenting with complaints of right gluteal abscess. Patient states that developed over the weekend, endorses malaise, fatigue, without complaints of fever, nausea or vomiting. Patient denies other medical problems and states he has been treated for the same in the past.  He endorses a history of MRSA. Physical exam is remarkable for well-appearing middle-age male, in no acute distress noted to be hypertensive, afebrile without tachycardia, satting well on room air. Discussed incision and drainage with patient at bedside and he is agreeable, refusing additional pain control at this time. We will pack, and provide oral antibiotics, encourage primary care follow-up for wound reevaluation and return for packing removal.    I&D [facility]    Date/Time: 11/16/2022 5:13 PM  Performed by: Ev Miller MD  Authorized by: Ev Miller MD     Consent:     Consent obtained:  Verbal    Consent given by:  Patient    Risks, benefits, and alternatives were discussed: yes      Risks discussed:  Bleeding, incomplete drainage, infection and pain    Alternatives discussed:  Alternative treatment  Universal protocol:     Patient identity confirmed:  Verbally with patient  Location:     Type:  Abscess    Size:  3cm    Location: right buttock.   Pre-procedure details:     Skin preparation:  Povidone-iodine  Sedation:     Sedation type:  None  Anesthesia:     Anesthesia method:  Local infiltration    Local anesthetic:  Lidocaine 1% w/o epi  Procedure type:     Complexity:  Simple  Procedure details:     Ultrasound guidance: no      Needle aspiration: no      Incision types:  Single straight    Incision depth:  Submucosal    Wound management: Probed and deloculated    Drainage:  Purulent    Drainage amount:  Scant    Wound treatment:  Wound left open and drain placed    Packing materials:  1/4 in gauze  Post-procedure details:     Procedure completion:  Tolerated well, no immediate complications

## 2022-11-19 ENCOUNTER — HOSPITAL ENCOUNTER (EMERGENCY)
Age: 47
Discharge: HOME | End: 2022-11-19
Attending: STUDENT IN AN ORGANIZED HEALTH CARE EDUCATION/TRAINING PROGRAM
Payer: COMMERCIAL

## 2022-11-19 VITALS
WEIGHT: 158.29 LBS | BODY MASS INDEX: 25.55 KG/M2 | SYSTOLIC BLOOD PRESSURE: 133 MMHG | TEMPERATURE: 97.7 F | DIASTOLIC BLOOD PRESSURE: 89 MMHG | OXYGEN SATURATION: 98 % | HEART RATE: 88 BPM

## 2022-11-19 DIAGNOSIS — Z51.89 ENCOUNTER FOR WOUND RE-CHECK: Primary | ICD-10-CM

## 2022-11-19 PROCEDURE — 99282 EMERGENCY DEPT VISIT SF MDM: CPT

## 2022-11-19 NOTE — ED TRIAGE NOTES
Patient arrives to the ED with chief complaint of wound re-check. Patient had an abscess drained on his right buttock and it was packed.

## 2022-11-20 NOTE — ED PROVIDER NOTES
Patient is a 79-year-old male presenting today with a wound check request.  He had an I&D performed here 2 days ago to an abscess of the right buttocks. He is uncertain if the packing remains in place but he does not think it fell out. Denies fever. Pain improving. No active drainage noted. Past Medical History:   Diagnosis Date    MRSA infection     Other ill-defined conditions(299.89)     neurological pain s/p back surgery       Past Surgical History:   Procedure Laterality Date    HX ORTHOPAEDIC      tumor removed from thoracic spine. H/O radiation treatment .  1985    HX OTHER SURGICAL  8/11/13    I&D of buttocks abscess x3 by Dr Sarah Gómez      Inguinal hernia repair         Family History:   Problem Relation Age of Onset    Stroke Father     Hypertension Father     Cancer Maternal Grandfather     Stroke Paternal Grandmother        Social History     Socioeconomic History    Marital status: SINGLE     Spouse name: Not on file    Number of children: Not on file    Years of education: Not on file    Highest education level: Not on file   Occupational History    Not on file   Tobacco Use    Smoking status: Never    Smokeless tobacco: Never   Vaping Use    Vaping Use: Never used   Substance and Sexual Activity    Alcohol use: Yes     Comment: socially    Drug use: No    Sexual activity: Not Currently   Other Topics Concern    Not on file   Social History Narrative    Not on file     Social Determinants of Health     Financial Resource Strain: Not on file   Food Insecurity: Not on file   Transportation Needs: Not on file   Physical Activity: Insufficiently Active    Days of Exercise per Week: 2 days    Minutes of Exercise per Session: 30 min   Stress: Not on file   Social Connections: Not on file   Intimate Partner Violence: Not At Risk    Fear of Current or Ex-Partner: No    Emotionally Abused: No    Physically Abused: No    Sexually Abused: No   Housing Stability: Not on file         ALLERGIES: Clindamycin    Review of Systems   Skin:  Positive for wound. All other systems reviewed and are negative. Vitals:    11/19/22 1620   BP: 133/89   Pulse: 88   Temp: 97.7 °F (36.5 °C)   SpO2: 98%   Weight: 71.8 kg (158 lb 4.6 oz)            Physical Exam  Constitutional:       General: He is not in acute distress. Appearance: He is well-developed. He is not ill-appearing. HENT:      Head: Normocephalic. Eyes:      Conjunctiva/sclera: Conjunctivae normal.   Pulmonary:      Effort: Pulmonary effort is normal. No respiratory distress. Musculoskeletal:         General: Normal range of motion. Cervical back: Normal range of motion. Skin:     General: Skin is warm and dry. Capillary Refill: Capillary refill takes less than 2 seconds. Comments: Well-healing abscess to the right buttocks, no active drainage. No associated cellulitis. Wound probed with forceps and no packing noted. Neurological:      Mental Status: He is alert and oriented to person, place, and time. Psychiatric:         Behavior: Behavior normal.        [facility]         Procedures      27-year-old male presenting today for a wound check. The wound appears to be healing well. He is afebrile. Pain is improving. No associated cellulitis. He is already taking antibiotics. Packing seems to have already fallen out. Patient okay for discharge home. ICD-10-CM ICD-9-CM    1.  Encounter for wound re-check  Z51.89 V58.89           Disposition: Discharge    [address],

## 2023-03-06 ENCOUNTER — OFFICE VISIT (OUTPATIENT)
Dept: PRIMARY CARE CLINIC | Age: 48
End: 2023-03-06
Payer: COMMERCIAL

## 2023-03-06 VITALS
RESPIRATION RATE: 12 BRPM | TEMPERATURE: 97.5 F | DIASTOLIC BLOOD PRESSURE: 80 MMHG | WEIGHT: 156.8 LBS | HEIGHT: 66 IN | OXYGEN SATURATION: 96 % | HEART RATE: 74 BPM | BODY MASS INDEX: 25.2 KG/M2 | SYSTOLIC BLOOD PRESSURE: 136 MMHG

## 2023-03-06 DIAGNOSIS — Z00.00 ANNUAL PHYSICAL EXAM: ICD-10-CM

## 2023-03-06 DIAGNOSIS — Z12.11 SCREENING FOR COLORECTAL CANCER: ICD-10-CM

## 2023-03-06 DIAGNOSIS — Z98.890 STATUS POST DILATATION OF ESOPHAGEAL STRICTURE: ICD-10-CM

## 2023-03-06 DIAGNOSIS — K90.49 FOOD INTOLERANCE IN ADULT: ICD-10-CM

## 2023-03-06 DIAGNOSIS — R19.7 DIARRHEA, UNSPECIFIED TYPE: ICD-10-CM

## 2023-03-06 DIAGNOSIS — G63 POLYNEUROPATHY DUE TO MEDICAL CONDITION (HCC): Primary | ICD-10-CM

## 2023-03-06 DIAGNOSIS — K21.9 GASTROESOPHAGEAL REFLUX DISEASE WITHOUT ESOPHAGITIS: ICD-10-CM

## 2023-03-06 DIAGNOSIS — Z22.322 MRSA CARRIER: ICD-10-CM

## 2023-03-06 DIAGNOSIS — C72.0 ASTROCYTOMA OF SPINAL CORD (HCC): ICD-10-CM

## 2023-03-06 DIAGNOSIS — L02.31 ABSCESS OF BUTTOCK: ICD-10-CM

## 2023-03-06 DIAGNOSIS — R73.02 IMPAIRED GLUCOSE TOLERANCE: ICD-10-CM

## 2023-03-06 DIAGNOSIS — Z87.19 STATUS POST DILATATION OF ESOPHAGEAL STRICTURE: ICD-10-CM

## 2023-03-06 DIAGNOSIS — Z11.59 ENCOUNTER FOR HEPATITIS C SCREENING TEST FOR LOW RISK PATIENT: ICD-10-CM

## 2023-03-06 DIAGNOSIS — K22.2 ESOPHAGEAL STRICTURE: ICD-10-CM

## 2023-03-06 DIAGNOSIS — Z12.12 SCREENING FOR COLORECTAL CANCER: ICD-10-CM

## 2023-03-06 PROCEDURE — 99214 OFFICE O/P EST MOD 30 MIN: CPT | Performed by: NURSE PRACTITIONER

## 2023-03-06 RX ORDER — OXCARBAZEPINE 300 MG/1
300 TABLET, FILM COATED ORAL DAILY
Qty: 90 TABLET | Refills: 1 | Status: SHIPPED | OUTPATIENT
Start: 2023-03-06

## 2023-03-06 NOTE — PROGRESS NOTES
Chief Complaint   Patient presents with    Follow-up       Health Maintenance Due   Topic    Hepatitis C Screening     COVID-19 Vaccine (3 - Booster for Jonathan series)    Flu Vaccine (1)    Colorectal Cancer Screening Combo         1. \"Have you been to the ER, urgent care clinic since your last visit? Hospitalized since your last visit? \" No    2. \"Have you seen or consulted any other health care providers outside of the [address] [place] [address] since your last visit? \" No     3. For patients aged 39-70: Has the patient had a colonoscopy / FIT/ Cologuard? No      If the patient is female:    4. For patients aged 41-77: Has the patient had a mammogram within the past 2 years? NA - based on age or sex      11. For patients aged 21-65: Has the patient had a pap smear? NA - based on age or sex     There were no vitals taken for this visit.

## 2023-03-06 NOTE — PROGRESS NOTES
[facility]   [address], [address] [facility]  P: [phone]  F: [phone]    SUBJECTIVE     HPI:     Vania Luciano is a 49 y.o. male who is seen in the clinic for   Chief Complaint   Patient presents with    Follow-up      The patient presents today for the management of his co-morbidities. 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 Currently rx'd Oxcarbazepine 300 mg QHS. Hx of Frequent Abscess formations on his buttocks. Requires intermittent admissions to the ED for Incise and Drainage along with subsequent rx for Doxycyline. Beginning several months prior, the patient has noticed intermittent episodes of diarrhea. Diarrhea occurs after eating certain foods at times. Of note, he previously saw Dr. Carolyn Murphy (GI) for management of his GERD/Esophageal Strictures. Patient Active Problem List    Diagnosis    Astrocytoma of spinal cord (HCC)    Neuropathic pain of foot    Stricture esophagus    Abscess of buttock        Past Medical History:   Diagnosis Date    MRSA infection     Other ill-defined conditions(409.89)     neurological pain s/p back surgery     Past Surgical History:   Procedure Laterality Date    HX ORTHOPAEDIC      tumor removed from thoracic spine. H/O radiation treatment .  1985    HX OTHER SURGICAL  8/11/13    I&D of buttocks abscess x3 by Dr Drea Sellers hernia repair     Social History     Socioeconomic History    Marital status: SINGLE     Spouse name: Not on file    Number of children: Not on file    Years of education: Not on file    Highest education level: Not on file   Occupational History    Not on file   Tobacco Use    Smoking status: Never    Smokeless tobacco: Never   Vaping Use    Vaping Use: Never used   Substance and Sexual Activity    Alcohol use: Yes     Comment: socially    Drug use: No    Sexual activity: Not Currently Other Topics Concern    Not on file   Social History Narrative    Not on file     Social Determinants of Health     Financial Resource Strain: Not on file   Food Insecurity: Not on file   Transportation Needs: Not on file   Physical Activity: Insufficiently Active    Days of Exercise per Week: 2 days    Minutes of Exercise per Session: 30 min   Stress: Not on file   Social Connections: Not on file   Intimate Partner Violence: Not At Risk    Fear of Current or Ex-Partner: No    Emotionally Abused: No    Physically Abused: No    Sexually Abused: No   Housing Stability: Not on file     Family History   Problem Relation Age of Onset    Stroke Father     Hypertension Father     Cancer Maternal Grandfather     Stroke Paternal Grandmother      Immunization History   Administered Date(s) Administered    COVID-19, J&J, (age 18y+), IM, 0.5mL 04/12/2021    COVID-19, PFIZER PURPLE top, DILUTE for use, (age 15 y+), IM, 30mcg/0.3mL 01/02/2022    Tdap 02/28/2007      Allergies   Allergen Reactions    Clindamycin Rash       No visits with results within 3 Month(s) from this visit. Latest known visit with results is:   Office Visit on 04/26/2022   Component Date Value Ref Range Status    Glucose 05/02/2022 80  65 - 99 mg/dL Final    BUN 05/02/2022 20  6 - 24 mg/dL Final    Creatinine 05/02/2022 1.19  0.76 - 1.27 mg/dL Final    eGFR 05/02/2022 76  >59 mL/min/1.73 Final    BUN/Creatinine ratio 05/02/2022 17  9 - 20 Final    Sodium 05/02/2022 137  134 - 144 mmol/L Final    Potassium 05/02/2022 4.9  3.5 - 5.2 mmol/L Final    Chloride 05/02/2022 100  96 - 106 mmol/L Final    CO2 05/02/2022 19 (A)  20 - 29 mmol/L Final    Calcium 05/02/2022 9.0  8.7 - 10.2 mg/dL Final    Protein, total 05/02/2022 6.7  6.0 - 8.5 g/dL Final    Albumin 05/02/2022 4.3  4.0 - 5.0 g/dL Final    GLOBULIN, TOTAL 05/02/2022 2.4  1.5 - 4.5 g/dL Final    A-G Ratio 05/02/2022 1.8  1.2 - 2.2 Final    Bilirubin, total 05/02/2022 <0.2  0.0 - 1.2 mg/dL Final    Alk. phosphatase 05/02/2022 169 (A)  44 - 121 IU/L Final    AST (SGOT) 05/02/2022 16  0 - 40 IU/L Final    ALT (SGPT) 05/02/2022 22  0 - 44 IU/L Final    WBC 05/02/2022 6.3  3.4 - 10.8 x10E3/uL Final    RBC 05/02/2022 4.69  4.14 - 5.80 x10E6/uL Final    HGB 05/02/2022 15.0  13.0 - 17.7 g/dL Final    HCT 05/02/2022 44.9  37.5 - 51.0 % Final    MCV 05/02/2022 96  79 - 97 fL Final    MCH 05/02/2022 32.0  26.6 - 33.0 pg Final    MCHC 05/02/2022 33.4  31.5 - 35.7 g/dL Final    RDW 05/02/2022 12.2  11.6 - 15.4 % Final    PLATELET 80/12/6930 493  150 - 450 x10E3/uL Final    NEUTROPHILS 05/02/2022 58  Not Estab. % Final    Lymphocytes 05/02/2022 25  Not Estab. % Final    MONOCYTES 05/02/2022 11  Not Estab. % Final    EOSINOPHILS 05/02/2022 4  Not Estab. % Final    BASOPHILS 05/02/2022 1  Not Estab. % Final    ABS. NEUTROPHILS 05/02/2022 3.7  1.4 - 7.0 x10E3/uL Final    Abs Lymphocytes 05/02/2022 1.6  0.7 - 3.1 x10E3/uL Final    ABS. MONOCYTES 05/02/2022 0.7  0.1 - 0.9 x10E3/uL Final    ABS. EOSINOPHILS 05/02/2022 0.2  0.0 - 0.4 x10E3/uL Final    ABS. BASOPHILS 05/02/2022 0.1  0.0 - 0.2 x10E3/uL Final    IMMATURE GRANULOCYTES 05/02/2022 1  Not Estab. % Final    ABS. IMM. GRANS. 05/02/2022 0.0  0.0 - 0.1 x10E3/uL Final    Vitamin B12 05/02/2022 310  232 - 1,245 pg/mL Final    Folate 05/02/2022 5.5  >3.0 ng/mL Final    Comment: A serum folate concentration of less than 3.1 ng/mL is  considered to represent clinical deficiency. MRI CERV SPINE WO CONT  Narrative: ADDITIONAL INDICATION:  Neck and back pain, previous thoracic surgery for tumor,  followed by radiation treatment in 1985. EXAM: MRI CERVICAL SPINE WITHOUT CONTRAST. Sagittal T1-weighted images, sagittal  turbo spin-echo T2-weighted images, sagittal STIR images, axial T1-weighted  images, axial T2 star weighted images. COMPARISON:  None  . FINDINGS:   Bony alignment is normal  . Bone signal intensity is within normal limits for  age .  Vertebral body heights and disc space heights  are preserved. .  The  cervical spinal cord is intrinsically normal in signal intensity and anatomy. The craniocervical junction and prevertebral soft tissue space are normal.    C2-3: Mild relative left foraminal narrowing. C3-4: Mild left foraminal stenosis. C4-5: Mild to moderate left foraminal stenosis. Uncal hypertrophy. C5-6: Broad spondylosis and bulge, Central focal protrusion, mild central  stenosis with cord effacement. . Moderately severe bilateral foraminal stenosis  with uncal hypertrophy. C6-7: No herniation or stenosis. Mild bilateral foraminal stenosis. C7-T1: No herniation or central stenosis. Impression: 1. C6-7: Mild bilateral foraminal stenosis. 2. C5-6: Broad bulge, central focal protrusion, mild central stenosis with cord  effacement. Moderate bilateral foraminal stenosis. 3. C4-5: Mild to moderate left foraminal stenosis. 4. C3-4: Mild left foraminal stenosis. 5. C2-3: Mild relative left foraminal narrowing. .. Current Outpatient Medications   Medication Sig Dispense Refill    OXcarbazepine (TRILEPTAL) 300 mg tablet Take 1 Tablet by mouth daily. 90 Tablet 1           The past medical history, past surgical history, and family history were reviewed and updated in the medical record. Lab values/Imaging were reviewed. The medications were reviewed and updated in the medical record. Immunizations were reviewed and updated in the medical record. All relevant preventative screenings reviewed and updated in the medical record. REVIEW OF SYSTEMS   Review of Systems   Constitutional:  Negative for malaise/fatigue and weight loss. HENT:  Negative for congestion and sore throat. Eyes:  Negative for blurred vision. Respiratory:  Negative for cough and shortness of breath. Cardiovascular:  Negative for chest pain and leg swelling. Gastrointestinal:  Positive for diarrhea.  Negative for abdominal pain, blood in stool, constipation, heartburn, melena, nausea and vomiting. Genitourinary:  Negative for flank pain, frequency, hematuria and urgency. Musculoskeletal:  Negative for back pain, falls, joint pain and myalgias. Skin:  Negative for itching and rash. Neurological:  Negative for dizziness, tingling, tremors, sensory change, speech change, focal weakness, seizures, loss of consciousness, weakness and headaches. Psychiatric/Behavioral:  Negative for depression, memory loss and suicidal ideas. The patient is not nervous/anxious and does not have insomnia. PHYSICAL EXAM   /80 (BP 1 Location: Left upper arm, BP Patient Position: Sitting, BP Cuff Size: Adult)   Pulse 74   Temp 97.5 °F (36.4 °C) (Temporal)   Resp 12   Ht 5' 6\" (1.676 m)   Wt 156 lb 12.8 oz (71.1 kg)   SpO2 96%   BMI 25.31 kg/m²      Physical Exam  Vitals and nursing note reviewed. Constitutional:       General: He is not in acute distress. Appearance: Normal appearance. He is well-developed. He is not diaphoretic. HENT:      Head: Normocephalic and atraumatic. Right Ear: Tympanic membrane, ear canal and external ear normal.      Left Ear: Tympanic membrane, ear canal and external ear normal.      Mouth/Throat:      Mouth: Mucous membranes are moist.      Pharynx: Oropharynx is clear. Eyes:      General: No scleral icterus. Right eye: No discharge. Left eye: No discharge. Extraocular Movements: Extraocular movements intact. Conjunctiva/sclera: Conjunctivae normal.   Neck:      Thyroid: No thyromegaly. Cardiovascular:      Rate and Rhythm: Normal rate and regular rhythm. Pulses: Normal pulses. Dorsalis pedis pulses are 2+ on the right side and 2+ on the left side. Pulmonary:      Effort: Pulmonary effort is normal.      Breath sounds: Normal breath sounds. No wheezing. Abdominal:      General: Bowel sounds are normal. There is no distension. Palpations: Abdomen is soft. Tenderness:  There is no abdominal tenderness. Musculoskeletal:      Cervical back: Normal range of motion and neck supple. Right lower leg: No edema. Left lower leg: No edema. Comments: B/L knees without crepitus   Lymphadenopathy:      Cervical: No cervical adenopathy. Skin:     Capillary Refill: Capillary refill takes less than 2 seconds. Findings: No abscess or erythema. Neurological:      Mental Status: Mental status is at baseline. Cranial Nerves: No cranial nerve deficit. Sensory: Sensory deficit present. Motor: No weakness. Coordination: Coordination normal.      Gait: Gait abnormal.      Deep Tendon Reflexes: Reflexes abnormal.      Reflex Scores:       Patellar reflexes are 2+ on the right side and 2+ on the left side. Psychiatric:         Mood and Affect: Mood normal.          ASSESSMENT/ PLAN   Below is the assessment and plan developed based on review of pertinent history, physical exam, labs, studies, and medications. 1. Polyneuropathy due to medical condition (HCC)  -     OXcarbazepine (TRILEPTAL) 300 mg tablet; Take 1 Tablet by mouth daily. , Normal, Disp-90 Tablet, R-1Appointment needed for future refills. 2. Annual physical exam  -     CBC WITH AUTOMATED DIFF; Future  -     METABOLIC PANEL, COMPREHENSIVE; Future  -     HEMOGLOBIN A1C WITH EAG; Future  -     LIPID PANEL; Future  3. Astrocytoma of spinal cord (HCC)  S/p removal.  4. Gastroesophageal reflux disease without esophagitis  -     REFERRAL TO GASTROENTEROLOGY  5. Esophageal stricture  -     REFERRAL TO GASTROENTEROLOGY  6. Status post dilatation of esophageal stricture  -     REFERRAL TO GASTROENTEROLOGY  7. Diarrhea, unspecified type  Advised the patient to begin taking a daily probiotic.   -     CELIAC DISEASE PROFILE (REFLEX TTG, IGG); Future  -     LACTOSE TOLERANCE TEST; Future  -     ALLERGEN PROFILE, FOOD IGE WITH COMPONENT REFLEXES; Future  -     CBC WITH AUTOMATED DIFF;  Future  -     METABOLIC PANEL, COMPREHENSIVE; Future  -     AMYLASE; Future  -     REFERRAL TO GASTROENTEROLOGY  8. Food intolerance in adult  -     CELIAC DISEASE PROFILE (REFLEX TTG, IGG); Future  -     LACTOSE TOLERANCE TEST; Future  -     ALLERGEN PROFILE, FOOD IGE WITH COMPONENT REFLEXES; Future  -     REFERRAL TO GASTROENTEROLOGY  9. Abscess of buttock  Will rx Doxycyline and refer to General Surgery as needed. 10. MRSA carrier  Will rx Doxycyline and refer to General Surgery as needed. 11. Impaired glucose tolerance  -     HEMOGLOBIN A1C WITH EAG; Future  12. Screening for colorectal cancer  -     HEPATITIS C AB; Future  13. Encounter for hepatitis C screening test for low risk patient         Follow-up and Dispositions    Return in about 4 months (around 7/6/2023) for Management of co-morbidities . Disclaimer:  Advised patient to call back or return to office if symptoms worsen/change/persist.  Discussed expected course/resolution/complications of diagnosis in detail with patient. Medication risks/benefits/alternatives discussed with patient. Patient was given an after visit summary which includes diagnoses, current medications, & vitals. Discussed patient instructions and advised to read to all patient instructions regarding care. Patient expressed understanding with the diagnosis and plan.        Silvestre Shaw NP  3/6/2023

## 2023-03-22 LAB
ALBUMIN SERPL-MCNC: 4.6 G/DL (ref 4–5)
ALBUMIN/GLOB SERPL: 2.7 {RATIO} (ref 1.2–2.2)
ALP SERPL-CCNC: 115 IU/L (ref 44–121)
ALT SERPL-CCNC: 18 IU/L (ref 0–44)
AMYLASE SERPL-CCNC: 60 U/L (ref 31–110)
AST SERPL-CCNC: 17 IU/L (ref 0–40)
BASOPHILS # BLD AUTO: 0.1 X10E3/UL (ref 0–0.2)
BASOPHILS NFR BLD AUTO: 1 %
BILIRUB SERPL-MCNC: 0.5 MG/DL (ref 0–1.2)
BUN SERPL-MCNC: 21 MG/DL (ref 6–24)
BUN/CREAT SERPL: 20 (ref 9–20)
CALCIUM SERPL-MCNC: 9.2 MG/DL (ref 8.7–10.2)
CHLORIDE SERPL-SCNC: 103 MMOL/L (ref 96–106)
CHOLEST SERPL-MCNC: 181 MG/DL (ref 100–199)
CLAM IGE QN: <0.1 KU/L
CO2 SERPL-SCNC: 25 MMOL/L (ref 20–29)
CODFISH IGE QN: <0.1 KU/L
CORN IGE QN: <0.1 KU/L
COW MILK IGE QN: <0.1 KU/L
CREAT SERPL-MCNC: 1.07 MG/DL (ref 0.76–1.27)
EGFRCR SERPLBLD CKD-EPI 2021: 86 ML/MIN/1.73
EGG WHITE IGE QN: <0.1 KU/L
ENDOMYSIUM IGA SER QL: NEGATIVE
EOSINOPHIL # BLD AUTO: 0.2 X10E3/UL (ref 0–0.4)
EOSINOPHIL NFR BLD AUTO: 3 %
ERYTHROCYTE [DISTWIDTH] IN BLOOD BY AUTOMATED COUNT: 13.4 % (ref 11.6–15.4)
EST. AVERAGE GLUCOSE BLD GHB EST-MCNC: 97 MG/DL
GLOBULIN SER CALC-MCNC: 1.7 G/DL (ref 1.5–4.5)
GLUCOSE SERPL-MCNC: 101 MG/DL (ref 70–99)
HBA1C MFR BLD: 5 % (ref 4.8–5.6)
HCT VFR BLD AUTO: 42.5 % (ref 37.5–51)
HCV IGG SERPL QL IA: NON REACTIVE
HDLC SERPL-MCNC: 45 MG/DL
HGB BLD-MCNC: 14.3 G/DL (ref 13–17.7)
IGA SERPL-MCNC: 239 MG/DL (ref 90–386)
IMM GRANULOCYTES # BLD AUTO: 0 X10E3/UL (ref 0–0.1)
IMM GRANULOCYTES NFR BLD AUTO: 1 %
LDLC SERPL CALC-MCNC: 99 MG/DL (ref 0–99)
LYMPHOCYTES # BLD AUTO: 2 X10E3/UL (ref 0.7–3.1)
LYMPHOCYTES NFR BLD AUTO: 34 %
Lab: NORMAL
MCH RBC QN AUTO: 31.2 PG (ref 26.6–33)
MCHC RBC AUTO-ENTMCNC: 33.6 G/DL (ref 31.5–35.7)
MCV RBC AUTO: 93 FL (ref 79–97)
MONOCYTES # BLD AUTO: 0.7 X10E3/UL (ref 0.1–0.9)
MONOCYTES NFR BLD AUTO: 12 %
NEUTROPHILS # BLD AUTO: 2.8 X10E3/UL (ref 1.4–7)
NEUTROPHILS NFR BLD AUTO: 49 %
PEANUT IGE QN: <0.1 KU/L
PLATELET # BLD AUTO: 289 X10E3/UL (ref 150–450)
POTASSIUM SERPL-SCNC: 4.4 MMOL/L (ref 3.5–5.2)
PROT SERPL-MCNC: 6.3 G/DL (ref 6–8.5)
RBC # BLD AUTO: 4.59 X10E6/UL (ref 4.14–5.8)
SCALLOP IGE QN: <0.1 KU/L
SESAME SEED IGE QN: <0.1 KU/L
SHRIMP IGE QN: <0.1 KU/L
SODIUM SERPL-SCNC: 141 MMOL/L (ref 134–144)
SOYBEAN IGE QN: <0.1 KU/L
TRIGL SERPL-MCNC: 216 MG/DL (ref 0–149)
TTG IGA SER-ACNC: <2 U/ML (ref 0–3)
VLDLC SERPL CALC-MCNC: 37 MG/DL (ref 5–40)
WALNUT IGE QN: <0.1 KU/L
WBC # BLD AUTO: 5.8 X10E3/UL (ref 3.4–10.8)
WHEAT IGE QN: <0.1 KU/L

## 2023-05-24 RX ORDER — OXCARBAZEPINE 300 MG/1
300 TABLET, FILM COATED ORAL DAILY
COMMUNITY
Start: 2023-03-06

## 2023-08-31 DIAGNOSIS — G63 POLYNEUROPATHY IN DISEASES CLASSIFIED ELSEWHERE (HCC): ICD-10-CM

## 2023-08-31 RX ORDER — OXCARBAZEPINE 300 MG/1
TABLET, FILM COATED ORAL
Qty: 30 TABLET | Refills: 0 | Status: SHIPPED | OUTPATIENT
Start: 2023-08-31

## 2023-08-31 NOTE — TELEPHONE ENCOUNTER
PCP: MELISSA Blanca NP    Last Visit 3/6/2023   No future appointments.     Requested Prescriptions     Pending Prescriptions Disp Refills    OXcarbazepine (TRILEPTAL) 300 MG tablet [Pharmacy Med Name: OXCARBAZEPINE 300 MG TABLET] 90 tablet 1     Sig: TAKE 1 TABLET BY MOUTH EVERY DAY         Other Comments: Last Refill   03/06/23

## 2023-10-02 DIAGNOSIS — G63 POLYNEUROPATHY IN DISEASES CLASSIFIED ELSEWHERE (HCC): ICD-10-CM

## 2023-10-02 RX ORDER — OXCARBAZEPINE 300 MG/1
300 TABLET, FILM COATED ORAL DAILY
Qty: 30 TABLET | Refills: 0 | Status: SHIPPED | OUTPATIENT
Start: 2023-10-02 | End: 2023-11-01

## 2023-10-02 NOTE — TELEPHONE ENCOUNTER
PCP: MELISSA Topete - NP    Last Visit Visit date not found   No future appointments.     Requested Prescriptions     Pending Prescriptions Disp Refills    OXcarbazepine (TRILEPTAL) 300 MG tablet [Pharmacy Med Name: OXCARBAZEPINE 300 MG TABLET] 30 tablet 0     Sig: TAKE 1 TABLET BY MOUTH EVERY DAY         Other Comments: Last Refill   08/31/23

## 2023-11-01 DIAGNOSIS — G63 POLYNEUROPATHY IN DISEASES CLASSIFIED ELSEWHERE (HCC): ICD-10-CM

## 2023-11-01 RX ORDER — OXCARBAZEPINE 300 MG/1
300 TABLET, FILM COATED ORAL DAILY
Qty: 30 TABLET | Refills: 0 | Status: SHIPPED | OUTPATIENT
Start: 2023-11-01 | End: 2023-12-06 | Stop reason: SDUPTHER

## 2023-11-01 NOTE — TELEPHONE ENCOUNTER
PCP: Kurt Avalos APRN - NP    Last Visit 3/6/2023   No future appointments.    Requested Prescriptions     Pending Prescriptions Disp Refills    OXcarbazepine (TRILEPTAL) 300 MG tablet [Pharmacy Med Name: OXCARBAZEPINE 300 MG TABLET] 30 tablet 0     Sig: TAKE 1 TABLET BY MOUTH DAILY NEEDS APPOINTMENT WITH PROVIDER. LAST REFILL THAT WILL BE GRANTED.         Other Comments: Last Refill   10/02/23

## 2023-12-06 DIAGNOSIS — G63 POLYNEUROPATHY IN DISEASES CLASSIFIED ELSEWHERE (HCC): ICD-10-CM

## 2023-12-06 RX ORDER — OXCARBAZEPINE 300 MG/1
300 TABLET, FILM COATED ORAL DAILY
Qty: 14 TABLET | Refills: 0 | Status: SHIPPED | OUTPATIENT
Start: 2023-12-06 | End: 2024-01-03 | Stop reason: SDUPTHER

## 2023-12-06 NOTE — TELEPHONE ENCOUNTER
PCP: Kurt Avalos APRN - NP    Last Visit 3/6/2023   No future appointments.    Requested Prescriptions     Pending Prescriptions Disp Refills    OXcarbazepine (TRILEPTAL) 300 MG tablet 30 tablet 0     Sig: Take 1 tablet by mouth daily NEEDS APPOINTMENT WITH PROVIDER. LAST REFILL THAT WILL BE GRANTED.         Other Comments: Last Refill   11/01/23

## 2023-12-09 DIAGNOSIS — G63 POLYNEUROPATHY IN DISEASES CLASSIFIED ELSEWHERE (HCC): ICD-10-CM

## 2023-12-11 RX ORDER — OXCARBAZEPINE 300 MG/1
300 TABLET, FILM COATED ORAL DAILY
Qty: 30 TABLET | OUTPATIENT
Start: 2023-12-11

## 2024-01-03 DIAGNOSIS — G63 POLYNEUROPATHY IN DISEASES CLASSIFIED ELSEWHERE (HCC): ICD-10-CM

## 2024-01-03 RX ORDER — OXCARBAZEPINE 300 MG/1
300 TABLET, FILM COATED ORAL DAILY
Qty: 30 TABLET | Refills: 0 | Status: SHIPPED | OUTPATIENT
Start: 2024-01-03

## 2024-01-03 NOTE — TELEPHONE ENCOUNTER
PCP: Kurt Avalos APRN - NP    Last Visit 3/6/2023   Future Appointments   Date Time Provider Department Center   1/18/2024  8:40 AM Kurt Avalos APRN - NP [facility]       Requested Prescriptions     Pending Prescriptions Disp Refills    OXcarbazepine (TRILEPTAL) 300 MG tablet 14 tablet 0     Sig: Take 1 tablet by mouth daily NEEDS APPOINTMENT WITH PROVIDER. LAST REFILL THAT WILL BE GRANTED.         Other Comments: Last Refill   12/06/23

## 2024-01-17 SDOH — ECONOMIC STABILITY: HOUSING INSECURITY
IN THE LAST 12 MONTHS, WAS THERE A TIME WHEN YOU DID NOT HAVE A STEADY PLACE TO SLEEP OR SLEPT IN A SHELTER (INCLUDING NOW)?: NO

## 2024-01-17 SDOH — ECONOMIC STABILITY: TRANSPORTATION INSECURITY
IN THE PAST 12 MONTHS, HAS LACK OF TRANSPORTATION KEPT YOU FROM MEETINGS, WORK, OR FROM GETTING THINGS NEEDED FOR DAILY LIVING?: NO

## 2024-01-17 SDOH — ECONOMIC STABILITY: FOOD INSECURITY: WITHIN THE PAST 12 MONTHS, THE FOOD YOU BOUGHT JUST DIDN'T LAST AND YOU DIDN'T HAVE MONEY TO GET MORE.: NEVER TRUE

## 2024-01-17 SDOH — ECONOMIC STABILITY: FOOD INSECURITY: WITHIN THE PAST 12 MONTHS, YOU WORRIED THAT YOUR FOOD WOULD RUN OUT BEFORE YOU GOT MONEY TO BUY MORE.: NEVER TRUE

## 2024-01-17 SDOH — ECONOMIC STABILITY: INCOME INSECURITY: HOW HARD IS IT FOR YOU TO PAY FOR THE VERY BASICS LIKE FOOD, HOUSING, MEDICAL CARE, AND HEATING?: NOT VERY HARD

## 2024-01-18 ENCOUNTER — OFFICE VISIT (OUTPATIENT)
Dept: PRIMARY CARE CLINIC | Facility: CLINIC | Age: 49
End: 2024-01-18
Payer: COMMERCIAL

## 2024-01-18 VITALS
TEMPERATURE: 97.1 F | OXYGEN SATURATION: 99 % | BODY MASS INDEX: 26.03 KG/M2 | HEART RATE: 69 BPM | RESPIRATION RATE: 18 BRPM | WEIGHT: 162 LBS | DIASTOLIC BLOOD PRESSURE: 80 MMHG | HEIGHT: 66 IN | SYSTOLIC BLOOD PRESSURE: 134 MMHG

## 2024-01-18 DIAGNOSIS — Z11.4 SCREENING FOR HIV WITHOUT PRESENCE OF RISK FACTORS: ICD-10-CM

## 2024-01-18 DIAGNOSIS — Z01.89 ENCOUNTER FOR ROUTINE LABORATORY TESTING: ICD-10-CM

## 2024-01-18 DIAGNOSIS — G63 POLYNEUROPATHY IN DISEASES CLASSIFIED ELSEWHERE (HCC): Primary | ICD-10-CM

## 2024-01-18 DIAGNOSIS — G63 POLYNEUROPATHY IN DISEASES CLASSIFIED ELSEWHERE (HCC): ICD-10-CM

## 2024-01-18 PROCEDURE — 99214 OFFICE O/P EST MOD 30 MIN: CPT | Performed by: NURSE PRACTITIONER

## 2024-01-18 RX ORDER — OXCARBAZEPINE 300 MG/1
300 TABLET, FILM COATED ORAL DAILY
Qty: 90 TABLET | Refills: 1 | Status: SHIPPED | OUTPATIENT
Start: 2024-01-18

## 2024-01-18 ASSESSMENT — PATIENT HEALTH QUESTIONNAIRE - PHQ9
SUM OF ALL RESPONSES TO PHQ9 QUESTIONS 1 & 2: 0
2. FEELING DOWN, DEPRESSED OR HOPELESS: 0
SUM OF ALL RESPONSES TO PHQ QUESTIONS 1-9: 0
1. LITTLE INTEREST OR PLEASURE IN DOING THINGS: 0
SUM OF ALL RESPONSES TO PHQ QUESTIONS 1-9: 0

## 2024-01-18 ASSESSMENT — ENCOUNTER SYMPTOMS: BACK PAIN: 0

## 2024-01-18 NOTE — PROGRESS NOTES
[facility]   [address], [address]  [place] [address]  P: [phone]  F: [phone]    SUBJECTIVE     HPI:     Maverick Matthew is a 48 y.o. male who is seen in the clinic for   Chief Complaint   Patient presents with    Follow-up    Medication Refill        The patient presents to the office today for the management of his co-morbidities.     Hx of Neuropathy from Astrocytoma s/p removal. Currently rx'd Oxcarbazepine 300 mg QHS.     Patient Active Problem List   Diagnosis    Abscess of buttock    Stricture esophagus    Astrocytoma of spinal cord (HCC)    Neuropathic pain of foot        Past Medical History:   Diagnosis Date    MRSA infection     Other ill-defined conditions(732.72)     neurological pain s/p back surgery     Past Surgical History:   Procedure Laterality Date    ORTHOPEDIC SURGERY      tumor removed from thoracic spine. H/O radiation treatment . 1985    OTHER SURGICAL HISTORY  8/11/13    I&D of buttocks abscess x3 by Dr Lisette Johns    [place] UNLISTED PROCEDURE ABDOMEN PERITONEUM & OMENTUM      Inguinal hernia repair     Social History     Socioeconomic History    Marital status: Single     Spouse name: Not on file    Number of children: Not on file    Years of education: Not on file    Highest education level: Not on file   Occupational History    Not on file   Tobacco Use    Smoking status: Never    Smokeless tobacco: Never   Substance and Sexual Activity    Alcohol use: Yes    Drug use: No    Sexual activity: Not on file   Other Topics Concern    Not on file   Social History Narrative    Not on file     Social Determinants of Health     Financial Resource Strain: Low Risk  (1/17/2024)    Overall Financial Resource Strain (CARDIA)     Difficulty of Paying Living Expenses: Not very hard   Food Insecurity: No Food Insecurity (1/17/2024)    Hunger Vital Sign     Worried About Running Out of Food in the Last Year: Never true     Ran Out of Food in the Last Year: Never true

## 2024-01-18 NOTE — PROGRESS NOTES
\"Have you been to the ER, urgent care clinic since your last visit?  Hospitalized since your last visit?\"    NO    “Have you seen or consulted any other health care providers outside of [facility] since your last visit?”    NO0    “Have you had a colorectal cancer screening such as a colonoscopy/FIT/Cologuard?    NO       Chief Complaint   Patient presents with    Follow-up    Medication Refill

## 2024-01-19 LAB
ALBUMIN SERPL-MCNC: 4.2 G/DL (ref 4.1–5.1)
ALBUMIN/GLOB SERPL: 2.2 {RATIO} (ref 1.2–2.2)
ALP SERPL-CCNC: 134 IU/L (ref 44–121)
ALT SERPL-CCNC: 25 IU/L (ref 0–44)
AST SERPL-CCNC: 19 IU/L (ref 0–40)
BILIRUB SERPL-MCNC: 0.3 MG/DL (ref 0–1.2)
BUN SERPL-MCNC: 18 MG/DL (ref 6–24)
BUN/CREAT SERPL: 18 (ref 9–20)
CALCIUM SERPL-MCNC: 8.9 MG/DL (ref 8.7–10.2)
CHLORIDE SERPL-SCNC: 105 MMOL/L (ref 96–106)
CO2 SERPL-SCNC: 20 MMOL/L (ref 20–29)
CREAT SERPL-MCNC: 1 MG/DL (ref 0.76–1.27)
EGFRCR SERPLBLD CKD-EPI 2021: 92 ML/MIN/1.73
ERYTHROCYTE [DISTWIDTH] IN BLOOD BY AUTOMATED COUNT: 12.1 % (ref 11.6–15.4)
GLOBULIN SER CALC-MCNC: 1.9 G/DL (ref 1.5–4.5)
GLUCOSE SERPL-MCNC: 105 MG/DL (ref 70–99)
HCT VFR BLD AUTO: 42.5 % (ref 37.5–51)
HGB BLD-MCNC: 14.5 G/DL (ref 13–17.7)
HIV 1+2 AB+HIV1 P24 AG SERPL QL IA: NON REACTIVE
MCH RBC QN AUTO: 31.3 PG (ref 26.6–33)
MCHC RBC AUTO-ENTMCNC: 34.1 G/DL (ref 31.5–35.7)
MCV RBC AUTO: 92 FL (ref 79–97)
PLATELET # BLD AUTO: 260 X10E3/UL (ref 150–450)
POTASSIUM SERPL-SCNC: 4.6 MMOL/L (ref 3.5–5.2)
PROT SERPL-MCNC: 6.1 G/DL (ref 6–8.5)
RBC # BLD AUTO: 4.64 X10E6/UL (ref 4.14–5.8)
SODIUM SERPL-SCNC: 141 MMOL/L (ref 134–144)
WBC # BLD AUTO: 5.2 X10E3/UL (ref 3.4–10.8)

## 2024-01-25 LAB — OXCARBAZEPINE SERPL-MCNC: 7 UG/ML (ref 10–35)

## 2024-01-26 ENCOUNTER — TELEPHONE (OUTPATIENT)
Dept: PRIMARY CARE CLINIC | Facility: CLINIC | Age: 49
End: 2024-01-26

## 2024-01-26 NOTE — TELEPHONE ENCOUNTER
----- Message from MELISSA Schrader NP sent at 1/19/2024  5:28 PM EST -----  Alkaline phosphatase marginally elevated, all other values for liver health are normal. We can monitor alkaline phosphatase with the next set of labs.  Glucose, if fasting for this test, is a little elevated as well. Please follow up with your PCP as indicated.

## 2024-02-12 ENCOUNTER — TELEPHONE (OUTPATIENT)
Dept: PRIMARY CARE CLINIC | Facility: CLINIC | Age: 49
End: 2024-02-12

## 2024-02-12 NOTE — TELEPHONE ENCOUNTER
----- Message from Maura Arellano sent at 2/9/2024  3:54 PM EST -----  Subject: Message to Provider    QUESTIONS  Information for Provider? Pt was attempting to return a call he had missed   from a nurse regarding his labs. [facility] was unable to connect pt to the   office. Please return call to pt.   ---------------------------------------------------------------------------  --------------  CALL BACK INFO  [phone]; OK to leave message on voicemail  ---------------------------------------------------------------------------  --------------  SCRIPT ANSWERS  undefined

## 2024-05-01 ENCOUNTER — COMMUNITY OUTREACH (OUTPATIENT)
Dept: PRIMARY CARE CLINIC | Facility: CLINIC | Age: 49
End: 2024-05-01

## 2024-07-30 DIAGNOSIS — G63 POLYNEUROPATHY IN DISEASES CLASSIFIED ELSEWHERE (HCC): ICD-10-CM

## 2024-08-01 RX ORDER — OXCARBAZEPINE 300 MG/1
300 TABLET, FILM COATED ORAL DAILY
Qty: 30 TABLET | Refills: 0 | Status: SHIPPED | OUTPATIENT
Start: 2024-08-01

## 2024-08-26 DIAGNOSIS — G63 POLYNEUROPATHY IN DISEASES CLASSIFIED ELSEWHERE (HCC): ICD-10-CM

## 2024-08-27 RX ORDER — OXCARBAZEPINE 300 MG/1
300 TABLET, FILM COATED ORAL DAILY
Qty: 90 TABLET | Refills: 1 | OUTPATIENT
Start: 2024-08-27

## 2024-08-27 NOTE — TELEPHONE ENCOUNTER
PCP: Kurt Avalos, APRN - NP    Last Visit Visit date not found   Future Appointments   Date Time Provider Department Center   8/29/2024  2:00 PM Chemo Reilly MD [facility]       Requested Prescriptions     Pending Prescriptions Disp Refills    OXcarbazepine (TRILEPTAL) 300 MG tablet [Pharmacy Med Name: OXCARBAZEPINE 300 MG TABLET] 90 tablet 1     Sig: TAKE 1 TABLET BY MOUTH EVERY DAY         Other Comments: Last Refill

## 2024-08-29 ENCOUNTER — OFFICE VISIT (OUTPATIENT)
Dept: PRIMARY CARE CLINIC | Facility: CLINIC | Age: 49
End: 2024-08-29
Payer: COMMERCIAL

## 2024-08-29 VITALS
BODY MASS INDEX: 26.68 KG/M2 | OXYGEN SATURATION: 97 % | SYSTOLIC BLOOD PRESSURE: 147 MMHG | RESPIRATION RATE: 18 BRPM | HEART RATE: 78 BPM | DIASTOLIC BLOOD PRESSURE: 84 MMHG | WEIGHT: 166 LBS | HEIGHT: 66 IN

## 2024-08-29 DIAGNOSIS — G63 POLYNEUROPATHY IN DISEASES CLASSIFIED ELSEWHERE (HCC): ICD-10-CM

## 2024-08-29 DIAGNOSIS — C72.0 MALIGNANT NEOPLASM OF SPINAL CORD (HCC): ICD-10-CM

## 2024-08-29 DIAGNOSIS — C72.0 ASTROCYTOMA OF SPINAL CORD (HCC): ICD-10-CM

## 2024-08-29 DIAGNOSIS — K22.2 ESOPHAGEAL OBSTRUCTION: ICD-10-CM

## 2024-08-29 DIAGNOSIS — G63 POLYNEUROPATHY IN DISEASES CLASSIFIED ELSEWHERE (HCC): Primary | ICD-10-CM

## 2024-08-29 PROCEDURE — 99213 OFFICE O/P EST LOW 20 MIN: CPT | Performed by: INTERNAL MEDICINE

## 2024-08-29 RX ORDER — OXCARBAZEPINE 300 MG/1
300 TABLET, FILM COATED ORAL DAILY
Qty: 90 TABLET | Refills: 1 | Status: SHIPPED | OUTPATIENT
Start: 2024-08-29

## 2024-08-29 NOTE — PROGRESS NOTES
Health Decision Maker has been checked with the patient            Chief Complaint   Patient presents with    New Patient       \"Have you been to the ER, urgent care clinic since your last visit?  Hospitalized since your last visit?\"    NO    “Have you seen or consulted any other health care providers outside of [facility] since your last visit?”    NO      Vitals:    08/29/24 1411   Weight: 75.3 kg (166 lb)      Depression: Not at risk (1/18/2024)    PHQ-2     PHQ-2 Score: 0        “Have you had a colorectal cancer screening such as a colonoscopy/FIT/Cologuard?    NO    No colonoscopy on file  No cologuard on file  No FIT/FOBT on file   No flexible sigmoidoscopy on file         Click Here for Release of Records Request        Chart reviewed: immunizations are documented.   Immunization History   Administered Date(s) Administered    COVID-19, J&J, (age 18y+), IM, 0.5 mL 04/12/2021    COVID-19, PFIZER, (2023-24 formula), (age 12y+), IM, 30mcg/0.3mL 01/02/2022    Hepatitis B 09/15/1994, 10/18/1994, 02/28/2007, 03/30/2007, 09/19/2017    Pertussis Vaccine 02/28/2007    TDaP, ADACEL (age 10y-64y), BOOSTRIX (age 10y+), IM, 0.5mL 02/28/2007

## 2024-08-30 LAB
ALBUMIN SERPL-MCNC: 4.1 G/DL (ref 4.1–5.1)
ALP SERPL-CCNC: 134 IU/L (ref 44–121)
ALT SERPL-CCNC: 22 IU/L (ref 0–44)
AST SERPL-CCNC: 18 IU/L (ref 0–40)
BILIRUB SERPL-MCNC: 0.3 MG/DL (ref 0–1.2)
BUN SERPL-MCNC: 18 MG/DL (ref 6–24)
BUN/CREAT SERPL: 13 (ref 9–20)
CALCIUM SERPL-MCNC: 9.1 MG/DL (ref 8.7–10.2)
CHLORIDE SERPL-SCNC: 102 MMOL/L (ref 96–106)
CO2 SERPL-SCNC: 20 MMOL/L (ref 20–29)
CREAT SERPL-MCNC: 1.37 MG/DL (ref 0.76–1.27)
EGFRCR SERPLBLD CKD-EPI 2021: 63 ML/MIN/1.73
ERYTHROCYTE [DISTWIDTH] IN BLOOD BY AUTOMATED COUNT: 12.7 % (ref 11.6–15.4)
GLOBULIN SER CALC-MCNC: 2.3 G/DL (ref 1.5–4.5)
GLUCOSE SERPL-MCNC: 89 MG/DL (ref 70–99)
HCT VFR BLD AUTO: 42.3 % (ref 37.5–51)
HGB BLD-MCNC: 14.7 G/DL (ref 13–17.7)
MCH RBC QN AUTO: 32.5 PG (ref 26.6–33)
MCHC RBC AUTO-ENTMCNC: 34.8 G/DL (ref 31.5–35.7)
MCV RBC AUTO: 93 FL (ref 79–97)
PLATELET # BLD AUTO: 280 X10E3/UL (ref 150–450)
POTASSIUM SERPL-SCNC: 4.6 MMOL/L (ref 3.5–5.2)
PROT SERPL-MCNC: 6.4 G/DL (ref 6–8.5)
RBC # BLD AUTO: 4.53 X10E6/UL (ref 4.14–5.8)
SODIUM SERPL-SCNC: 137 MMOL/L (ref 134–144)
WBC # BLD AUTO: 7 X10E3/UL (ref 3.4–10.8)

## 2024-08-30 NOTE — PROGRESS NOTES
Maverick Matthew is a 48 y.o. male and presents with     Chief Complaint   Patient presents with    New Patient       History of Present Illness  The patient presents for evaluation of neuropathy.    He has a medical history of astrocytoma, which was surgically removed in 1985. His last MRI scan in 2010 showed no recurrence of the astrocytoma.    He suffers from neuropathy and is currently taking oxycarbazepine, which was prescribed in 2000 during his 3-year residence in [place]. He sought treatment at a pain clinic there after previous treatments failed to alleviate his symptoms. The prescribing doctor suggested several medications, but oxycarbazepine proved to be the most effective. His blood work is typically checked after every 2 or 3 prescription refills.    He underwent an MRI of his neck in 2021 and was under the care of Dr. Carlton at that time.    He also had a stricture of his esophagus, which was successfully treated. He is currently taking over-the-counter Nexium or Prilosec for acid reflux.    He reports no chest pain or suicidal thoughts.    SOCIAL HISTORY  He works as an [email or web address] .         Past Medical History:   Diagnosis Date    MRSA infection     Other ill-defined conditions(799.89)     neurological pain s/p back surgery     Past Surgical History:   Procedure Laterality Date    ORTHOPEDIC SURGERY      tumor removed from thoracic spine. H/O radiation treatment . 1985    OTHER SURGICAL HISTORY  8/11/13    I&D of buttocks abscess x3 by Dr Lisette Johns    [place] UNLISTED PROCEDURE ABDOMEN PERITONEUM & OMENTUM      Inguinal hernia repair     Current Outpatient Medications   Medication Sig    OXcarbazepine (TRILEPTAL) 300 MG tablet Take 1 tablet by mouth daily     No current facility-administered medications for this visit.     Health Maintenance   Topic Date Due    DTaP/Tdap/Td vaccine (2 - Td or Tdap) 02/28/2017    Colorectal Cancer Screen  Never done    COVID-19 Vaccine (3 -

## 2024-09-02 DIAGNOSIS — N18.9 CHRONIC KIDNEY DISEASE, UNSPECIFIED CKD STAGE: Primary | ICD-10-CM

## 2025-02-23 DIAGNOSIS — G63 POLYNEUROPATHY IN DISEASES CLASSIFIED ELSEWHERE: ICD-10-CM

## 2025-02-24 RX ORDER — OXCARBAZEPINE 300 MG/1
300 TABLET, FILM COATED ORAL DAILY
Qty: 90 TABLET | Refills: 1 | Status: SHIPPED | OUTPATIENT
Start: 2025-02-24

## 2025-02-24 NOTE — TELEPHONE ENCOUNTER
PCP: Chemo Reilly MD    Last Visit 8/29/2024   No future appointments.    Requested Prescriptions     Pending Prescriptions Disp Refills    OXcarbazepine (TRILEPTAL) 300 MG tablet [Pharmacy Med Name: OXCARBAZEPINE 300 MG TABLET] 90 tablet 1     Sig: TAKE 1 TABLET BY MOUTH EVERY DAY         Other Comments: Last Refill   08/29/24

## 2025-05-17 ENCOUNTER — OFFICE VISIT (OUTPATIENT)
Age: 50
End: 2025-05-17

## 2025-05-17 VITALS
SYSTOLIC BLOOD PRESSURE: 136 MMHG | BODY MASS INDEX: 26.79 KG/M2 | TEMPERATURE: 97.5 F | OXYGEN SATURATION: 95 % | RESPIRATION RATE: 16 BRPM | WEIGHT: 166 LBS | DIASTOLIC BLOOD PRESSURE: 86 MMHG | HEART RATE: 90 BPM

## 2025-05-17 DIAGNOSIS — R03.0 ELEVATED BLOOD PRESSURE READING: ICD-10-CM

## 2025-05-17 DIAGNOSIS — L25.5 RHUS DERMATITIS: Primary | ICD-10-CM

## 2025-05-17 RX ORDER — PREDNISONE 10 MG/1
TABLET ORAL
Qty: 30 TABLET | Refills: 0 | Status: SHIPPED | OUTPATIENT
Start: 2025-05-17

## 2025-05-17 NOTE — PROGRESS NOTES
Subjective     Chief Complaint   Patient presents with    Poison Ivy     Exposed x 2.5 weeks ago, rash on multiple area of his body       Patient is 49 year old male presenting with rash for two and half weeks.  He has been using hydrocortisone and calamine lotion.         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    OTHER SURGICAL HISTORY  8/11/13    I&D of buttocks abscess x3 by Dr Lisette Johns    IA UNLISTED PROCEDURE ABDOMEN PERITONEUM & OMENTUM      Inguinal hernia repair       Family History   Problem Relation Age of Onset    Stroke Paternal Grandmother     Cancer Maternal Grandfather     Hypertension Father     Stroke Father        Allergies   Allergen Reactions    Clindamycin Rash       Social History     Tobacco Use    Smoking status: Never    Smokeless tobacco: Never   Substance Use Topics    Alcohol use: Yes    Drug use: No       Vitals:    05/17/25 1740   BP: (!) 151/81   Pulse: 90   Resp: 16   Temp: 97.5 °F (36.4 °C)   SpO2: 95%       Objective     Physical Exam    Assessment & Plan     Diagnoses and all orders for this visit:  Rhus dermatitis  -     predniSONE (DELTASONE) 10 MG tablet; Take 4 tabs by mouth daily for 3 days,3 tabs for 3 days,2 tabs for 3 days, 1 tab for 3 days.Take with food.  Elevated blood pressure reading    Patient in no acute distress and nontoxic appearing    Area with redness, swelling, blistering, and linear streaks noted. Erythematous papules or plaques, vesicles and bullae, often arranged in characteristic, linear or streak-like configurations in areas of skin that could have come in contact with plant parts.    Education provided on Urushiol-induced contact dermatitis, including identification of poison ivy, poison oak

## 2025-05-17 NOTE — PATIENT INSTRUCTIONS
of physical activity is important because each one contributes to your overall fitness.  What are the benefits of being active?  Being active is one of the best things you can do for your health. It helps you to:  Feel stronger and have more energy to do all the things you like to do.  Focus better at school or work.  Feel, think, and sleep better.  Reach and stay at a healthy weight.  Lose fat and build lean muscle.  Lower your risk for serious health problems, including diabetes, heart attack, high blood pressure, and some cancers.  Keep your heart, lungs, bones, muscles, and joints strong and healthy.  How can you make being active part of your life?  Start slowly. Make it your long-term goal to get at least 30 minutes of exercise on most days of the week. Walking is a good choice. You also may want to do other activities, such as running, swimming, cycling, or playing tennis or team sports.  Pick activities that you like--ones that make your heart beat faster, your muscles stronger, and your muscles and joints more flexible. If you find more than one thing you like doing, do them all. You don't have to do the same thing every day.  Get your heart pumping every day. Any activity that makes your heart beat faster and keeps it at that rate for a while counts.  Here are some great ways to get your heart beating faster:  Go for a brisk walk, run, or hike.  Go for a swim or bike ride.  Take an online exercise class or dance.  Play a game of touch football, basketball, or soccer.  Play tennis, pickleball, or racquetball.  Climb stairs.  Even some household chores can be aerobic. Just do them at a faster pace. Raking or mowing the lawn, sweeping the garage, and vacuuming and cleaning your home all can help get your heart rate up.  Strengthen your muscles during the week. You don't have to lift heavy weights or grow big, bulky muscles to get stronger. Doing a few simple activities that make your muscles work against, or

## 2025-06-10 ENCOUNTER — OFFICE VISIT (OUTPATIENT)
Age: 50
End: 2025-06-10

## 2025-06-10 VITALS
DIASTOLIC BLOOD PRESSURE: 79 MMHG | TEMPERATURE: 97.6 F | WEIGHT: 166 LBS | SYSTOLIC BLOOD PRESSURE: 153 MMHG | RESPIRATION RATE: 16 BRPM | OXYGEN SATURATION: 99 % | HEART RATE: 75 BPM | BODY MASS INDEX: 26.79 KG/M2
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
## 2025-06-10 DIAGNOSIS — M79.631 PAIN OF RIGHT FOREARM: ICD-10-CM

## 2025-06-11 NOTE — PATIENT INSTRUCTIONS
Cat bite    Augmentin: 1 pill twice daily x 10 days    Ibuprofen 600 mg every 6 hours as needed for swelling and pain    Tetanus booster given today in clinic

## 2025-06-11 NOTE — PROGRESS NOTES
06/10/25.      Objective   Physical Exam  Constitutional:       General: He is not in acute distress.     Appearance: Normal appearance. He is not ill-appearing or toxic-appearing.   HENT:      Head: Normocephalic.      Nose: Nose normal.      Mouth/Throat:      Mouth: Mucous membranes are moist.   Eyes:      Conjunctiva/sclera: Conjunctivae normal.   Cardiovascular:      Rate and Rhythm: Normal rate.      Comments: 2+ right radial pulse  Pulmonary:      Effort: Pulmonary effort is normal.   Musculoskeletal:      Comments: Normal movement, sensation,  strength of right hand and fingers   Skin:     Comments: .  Roughly 3 cm x 2.5 cm dorsal aspect of the right forearm just below the wrist of mild erythema and swelling.  There is a small, nonbleeding abrasion in the middle, no other open areas.  No weeping or oozing, no streaking   Neurological:      Mental Status: He is alert and oriented to person, place, and time.   Psychiatric:         Behavior: Behavior normal.               An electronic signature was used to authenticate this note.    JOVI Cash

## 2025-08-19 DIAGNOSIS — G63 POLYNEUROPATHY IN DISEASES CLASSIFIED ELSEWHERE: ICD-10-CM

## 2025-08-19 RX ORDER — OXCARBAZEPINE 300 MG/1
300 TABLET, FILM COATED ORAL DAILY
Qty: 30 TABLET | Refills: 0 | Status: SHIPPED | OUTPATIENT
Start: 2025-08-19